# Patient Record
Sex: MALE | Race: OTHER | NOT HISPANIC OR LATINO | Employment: OTHER | URBAN - METROPOLITAN AREA
[De-identification: names, ages, dates, MRNs, and addresses within clinical notes are randomized per-mention and may not be internally consistent; named-entity substitution may affect disease eponyms.]

---

## 2020-01-30 ENCOUNTER — OFFICE VISIT (OUTPATIENT)
Dept: FAMILY MEDICINE | Facility: CLINIC | Age: 70
End: 2020-01-30
Payer: MEDICARE

## 2020-01-30 VITALS
WEIGHT: 200.94 LBS | HEIGHT: 72 IN | BODY MASS INDEX: 27.21 KG/M2 | OXYGEN SATURATION: 95 % | TEMPERATURE: 99 F | SYSTOLIC BLOOD PRESSURE: 122 MMHG | HEART RATE: 93 BPM | DIASTOLIC BLOOD PRESSURE: 62 MMHG

## 2020-01-30 DIAGNOSIS — Z00.00 ANNUAL PHYSICAL EXAM: Primary | ICD-10-CM

## 2020-01-30 DIAGNOSIS — R73.09 ABNORMAL GLUCOSE: ICD-10-CM

## 2020-01-30 DIAGNOSIS — Z23 ENCOUNTER FOR ADMINISTRATION OF VACCINE: ICD-10-CM

## 2020-01-30 DIAGNOSIS — J06.9 UPPER RESPIRATORY TRACT INFECTION, UNSPECIFIED TYPE: ICD-10-CM

## 2020-01-30 DIAGNOSIS — I48.19 PERSISTENT ATRIAL FIBRILLATION: ICD-10-CM

## 2020-01-30 DIAGNOSIS — Z72.0 TOBACCO USE: ICD-10-CM

## 2020-01-30 DIAGNOSIS — R05.9 COUGH: ICD-10-CM

## 2020-01-30 DIAGNOSIS — E66.3 OVERWEIGHT (BMI 25.0-29.9): ICD-10-CM

## 2020-01-30 DIAGNOSIS — E78.9 DISORDER OF LIPID METABOLISM: ICD-10-CM

## 2020-01-30 DIAGNOSIS — Z79.01 CHRONIC ANTICOAGULATION: ICD-10-CM

## 2020-01-30 DIAGNOSIS — Z12.11 COLON CANCER SCREENING: ICD-10-CM

## 2020-01-30 PROCEDURE — 1159F MED LIST DOCD IN RCRD: CPT | Mod: S$GLB,,, | Performed by: FAMILY MEDICINE

## 2020-01-30 PROCEDURE — G0008 FLU VACCINE - HIGH DOSE (65+) PRESERVATIVE FREE IM: ICD-10-PCS | Mod: S$GLB,,, | Performed by: FAMILY MEDICINE

## 2020-01-30 PROCEDURE — 90670 PCV13 VACCINE IM: CPT | Mod: S$GLB,,, | Performed by: FAMILY MEDICINE

## 2020-01-30 PROCEDURE — 1126F AMNT PAIN NOTED NONE PRSNT: CPT | Mod: S$GLB,,, | Performed by: FAMILY MEDICINE

## 2020-01-30 PROCEDURE — 1159F PR MEDICATION LIST DOCUMENTED IN MEDICAL RECORD: ICD-10-PCS | Mod: S$GLB,,, | Performed by: FAMILY MEDICINE

## 2020-01-30 PROCEDURE — 90662 FLU VACCINE - HIGH DOSE (65+) PRESERVATIVE FREE IM: ICD-10-PCS | Mod: S$GLB,,, | Performed by: FAMILY MEDICINE

## 2020-01-30 PROCEDURE — 90662 IIV NO PRSV INCREASED AG IM: CPT | Mod: S$GLB,,, | Performed by: FAMILY MEDICINE

## 2020-01-30 PROCEDURE — G0009 PNEUMOCOCCAL CONJUGATE VACCINE 13-VALENT LESS THAN 5YO & GREATER THAN: ICD-10-PCS | Mod: S$GLB,,, | Performed by: FAMILY MEDICINE

## 2020-01-30 PROCEDURE — 1126F PR PAIN SEVERITY QUANTIFIED, NO PAIN PRESENT: ICD-10-PCS | Mod: S$GLB,,, | Performed by: FAMILY MEDICINE

## 2020-01-30 PROCEDURE — 90670 PNEUMOCOCCAL CONJUGATE VACCINE 13-VALENT LESS THAN 5YO & GREATER THAN: ICD-10-PCS | Mod: S$GLB,,, | Performed by: FAMILY MEDICINE

## 2020-01-30 PROCEDURE — 99202 OFFICE O/P NEW SF 15 MIN: CPT | Mod: 25,S$GLB,, | Performed by: FAMILY MEDICINE

## 2020-01-30 PROCEDURE — G0009 ADMIN PNEUMOCOCCAL VACCINE: HCPCS | Mod: S$GLB,,, | Performed by: FAMILY MEDICINE

## 2020-01-30 PROCEDURE — 1101F PR PT FALLS ASSESS DOC 0-1 FALLS W/OUT INJ PAST YR: ICD-10-PCS | Mod: CPTII,S$GLB,, | Performed by: FAMILY MEDICINE

## 2020-01-30 PROCEDURE — 99999 PR PBB SHADOW E&M-NEW PATIENT-LVL III: CPT | Mod: PBBFAC,,, | Performed by: FAMILY MEDICINE

## 2020-01-30 PROCEDURE — 99202 PR OFFICE/OUTPT VISIT, NEW, LEVL II, 15-29 MIN: ICD-10-PCS | Mod: 25,S$GLB,, | Performed by: FAMILY MEDICINE

## 2020-01-30 PROCEDURE — 99999 PR PBB SHADOW E&M-NEW PATIENT-LVL III: ICD-10-PCS | Mod: PBBFAC,,, | Performed by: FAMILY MEDICINE

## 2020-01-30 PROCEDURE — 1101F PT FALLS ASSESS-DOCD LE1/YR: CPT | Mod: CPTII,S$GLB,, | Performed by: FAMILY MEDICINE

## 2020-01-30 PROCEDURE — G0008 ADMIN INFLUENZA VIRUS VAC: HCPCS | Mod: S$GLB,,, | Performed by: FAMILY MEDICINE

## 2020-01-30 RX ORDER — WARFARIN SODIUM 5 MG/1
TABLET ORAL
COMMUNITY
End: 2023-09-01

## 2020-01-30 RX ORDER — WARFARIN SODIUM 5 MG/1
TABLET ORAL
COMMUNITY
Start: 2019-12-20 | End: 2020-01-30 | Stop reason: SDUPTHER

## 2020-01-30 NOTE — PROGRESS NOTES
Office Visit    Patient Name: Ryan Kenney    : 1950  MRN: [id number]      Assessment/Plan:  Ryan Kenney is a 69 y.o. male who presents today for :    Annual physical exam  -     Hemoglobin A1c; Future; Expected date: 2020  -     CBC Without Differential; Future; Expected date: 2020  -     Comprehensive metabolic panel; Future; Expected date: 2020  -     Lipid panel; Future; Expected date: 2020  -     Hepatitis C antibody; Future; Expected date: 2020  Encounter for administration of vaccine  -     Influenza - High Dose (65+) (PF) (IM)  -     Cancel: (In Office Administered) Pneumococcal Polysaccharide Vaccine (23 Valent) (SQ/IM)  Colon cancer screening  -     Fecal Immunochemical Test (iFOBT); Future; Expected date: 2020  Overweight (BMI 25.0-29.9)  -anticipatory guidance provided with age appropriate preventative services discussed, healthy diet and regular physical exercise also discussed with patient  -any additional health maintenance will be readdressed at the next physical if declined or deferred by the patient today   -Recommend 15-30 minutes of moderate intensity exercise 5 days/week.              Follow up 6mo          Additional Evaluation & Management issues:     In addition to today's Annual Physical, patient has other medical issues that need to be addressed, as well as their associated prescription management that is separate from today's Physical  - as documented separately below the Annual Physical portion of this encounter.        This note was created by combination of typed  and MModal dictation.  Transcription errors may be present.  If there are any questions, please contact me.        ----------------------------------------------------------------------------------------------------------------------      HPI:  Patient Care Team:  Ilya Finney MD as PCP - General (Family Medicine)  Mike Wesley MD (Cardiology)    Ryan is a  69 y.o. male with      Patient Active Problem List   Diagnosis    Tobacco use    Overweight (BMI 25.0-29.9)    Persistent atrial fibrillation - on Coumadin - monitored regularly at Cardiology's office @     Chronic anticoagulation     This patient is new to me       Patient presents today for:  URI; Cough; and Establish Care      In addition to addressing the reasons for this office visit as above, which is further discussed and addressed in the separate E&M section of this note, patient also due for annual bloodwork today.  Health maintenance-wise, he is due for colon cancer screening.   Otherwise, no major new changes in health since last checkup with prior PCP several years ago. He sees Cardiology at  for management of pAFib, for which he is on coumadin as monitored by his Cardiology's office regularly.  He denies CP/SOB/DOYLE.  He denies any cardiovascular or neurologic complaints today        Additional ROS  No F/C/wt changes/fatigue  No dysphagia/sore throat, +rhinorrhea/cough/nasal congestion  No CP/DOYLE/palpitations/swelling  No wheezing/SOB  No nausea/vomiting/abd pain/no diarrhea, no constipation, no blood in stool  No muscle aches, no joint pain   No rashes  No MSK weakness/HA/tingling/numbness  No anxiety/depression  No dysuria/hematuria  No polyuria/polydipsia/fatigue/cold or hot intolerance          Current Medications  Medications reviewed and updated.       Current Outpatient Medications:     warfarin (COUMADIN) 5 MG tablet, Take by mouth., Disp: , Rfl:     History reviewed. No pertinent surgical history.    History reviewed. No pertinent family history.    Social History     Socioeconomic History    Marital status: Unknown     Spouse name: Not on file    Number of children: Not on file    Years of education: Not on file    Highest education level: Not on file   Occupational History    Not on file   Social Needs    Financial resource strain: Not on file    Food insecurity:     Worry: Not  on file     Inability: Not on file    Transportation needs:     Medical: Not on file     Non-medical: Not on file   Tobacco Use    Smoking status: Current Every Day Smoker   Substance and Sexual Activity    Alcohol use: Not on file    Drug use: Not on file    Sexual activity: Not on file   Lifestyle    Physical activity:     Days per week: Not on file     Minutes per session: Not on file    Stress: Not on file   Relationships    Social connections:     Talks on phone: Not on file     Gets together: Not on file     Attends Gnosticist service: Not on file     Active member of club or organization: Not on file     Attends meetings of clubs or organizations: Not on file     Relationship status: Not on file   Other Topics Concern    Not on file   Social History Narrative    Not on file           Allergies   Review of patient's allergies indicates:  No Known Allergies          Review of Systems  See HPI      Physical Exam  /62   Pulse 93   Temp 98.7 °F (37.1 °C)   Ht 6' (1.829 m)   Wt 91.2 kg (200 lb 15.2 oz)   SpO2 95%   BMI 27.25 kg/m²       GEN: NAD, well developed, pleasant, well nourished  HEENT: NCAT, PERRLA, EOMI, sclera clear, anicteric, O/P without erythema nor swollen tonsils, +PND, MMM with no lesions, TM clear bilaterally with normal light reflex, no bulging/effusion. No maxillary/frontal sinus TTP. No trismus/uvula deviation  NECK: normal, supple with midline trachea, no LAD, no thyromegaly  LUNGS: CTAB, no w/r/r, no increased work of breathing   HEART: +irregularly irregular rhythm with normal rate, normal S1 and S2, no m/r/g, no edema  ABD: s/nt/nd, NABS  SKIN: normal turgor, no rashes  PSYCH: AOx3, appropriate mood and affect  MSK: warm/well perfused, normal ROM in all extremities, no c/c/e.  NEURO: normal without focal findings, CN II-XII are grossly intact.  Sensation/strength grossly normal, gait and station normal.         Labs  No results found for: LABA1C, HGBA1C  No results  found for: NA, K, CL, CO2, BUN, CREATININE, CALCIUM, ANIONGAP, ESTGFRAFRICA, EGFRNONAA  No results found for: CHOL  No results found for: HDL  No results found for: LDLCALC  No results found for: TRIG  No results found for: CHOLHDL  Last set of blood work has been reviewed as noted above.          __________________________________________________________________________________________________________________________________      Additional Evaluation & Management issues:     In addition to today's Annual Physical, patient has other medical issues that need to be addressed, as well as their associated prescription management that is separate from today's Physical  - as documented separately below      HPI:    Patient presents today for:  URI; Cough; and Establish Care      Pt has had nasal congestion/cough the past week - feels like Sx has mostly resolved with taking OTC Delsym the past few days. Still has mild runny nose. He denies any facial pressure and pain. He has mild +occasional mild productive cough of clear phlegm. No sick contact with similar Sx.No sore throat.  No body aches.  No ear pain.  No F/C    pAfib - stable, Dx'd over 20 years ago - sees Cardiology Dr. Wesley at  for management regularly - no recent complications. Had routine labs and heart imaging studies done the past year, DEBI requested today for records.  He denies CP/SOB/DOYLE.  Patient has no gum/GI bleeding/new skin bruising with taking long term oral anticoagulation medication, which we discussed and reviewed bleeding risks associated with OAC today and the need to contact our office/go to ER right away should something like that occur.          Additional ROS  No F/C/wt changes/fatigue  No dysphagia/sore throat, +rhinorrhea/cough/nasal congestion  No CP/DOYLE/palpitations/swelling  No wheezing/SOB  No nausea/vomiting/abd pain/no diarrhea  No muscle aches, no joint pain   No rashes                Review of Systems  See  HPI        Physical Exam  /62   Pulse 93   Temp 98.7 °F (37.1 °C)   Ht 6' (1.829 m)   Wt 91.2 kg (200 lb 15.2 oz)   SpO2 95%   BMI 27.25 kg/m²       GEN: NAD, well developed, pleasant, well nourished  HEENT: NCAT, PERRLA, EOMI, sclera clear, anicteric, O/P without erythema nor swollen tonsils, +PND, MMM with no lesions, TM clear bilaterally with normal light reflex, no bulging/effusion. No maxillary/frontal sinus TTP. No trismus/uvula deviation  NECK: normal, supple with midline trachea, no LAD, no thyromegaly  LUNGS: CTAB, no w/r/r, no increased work of breathing   HEART: +irregularly irregular rhythm with normal rate, normal S1 and S2, no m/r/g, no edema  ABD: s/nt/nd, NABS  SKIN: normal turgor, no rashes  PSYCH: AOx3, appropriate mood and affect  MSK: warm/well perfused, normal ROM in all extremities, no c/c/e.                      Assessment/Plan:  Ryan Kenney is a 69 y.o. male who presents today for :    Upper respiratory tract infection, unspecified type  Cough  -Mild symptoms, most likely viral etiology  AFVSS in clinic today - appears to have mostly resolved.  -advised frequent hand washing, rest, and plenty of fluids.   -follow up as needed    Persistent atrial fibrillation - on Coumadin - monitored regularly at Cardiology's office @   -     [facility] Without Differential; Future; Expected date: 01/30/2020  -     Protime-INR; Future; Expected date: 01/30/2020  Chronic anticoagulation  -     Protime-INR; Future; Expected date: 01/30/2020  -stable, continue current medication regimen PRN  -f/u Cardiology as needed  DEBI requested today for records.                    Follow up for worsening Sx. Urgent care/ED precautions provided.

## 2020-01-30 NOTE — PROGRESS NOTES
Injections x 2 administered as ordered.  Tolerated well.  Told to wait in clinic for 15 mins.  Patient verbalized understanding.  [employer] was given to patient on 1/30/2020 2:27 PM

## 2020-07-15 ENCOUNTER — TELEPHONE (OUTPATIENT)
Dept: FAMILY MEDICINE | Facility: CLINIC | Age: 70
End: 2020-07-15

## 2020-07-15 DIAGNOSIS — D22.9 CHANGE IN COLOR OF SKIN MOLE: ICD-10-CM

## 2020-07-15 DIAGNOSIS — Z12.11 COLON CANCER SCREENING: Primary | ICD-10-CM

## 2020-07-15 NOTE — TELEPHONE ENCOUNTER
Patient states that it is on right shoulder blade. States that he can not see it but his wife seen it on his back and should see someone.    Please address the patient concerns. Patient states that he would like someone that accepts the PHN.     Thanks,  Steff

## 2020-07-15 NOTE — TELEPHONE ENCOUNTER
----- Message from Stacie Donavon sent at 7/15/2020  2:21 PM CDT -----  Regarding: referral  Type: Patient Call Back    Who called:pt    What is the request in detail:pt is requesting referral for dermatology for a mole. Call pt    Can the clinic reply by [facility]?    Would the patient rather a call back or a response via [facility]? call    Best call back number:[phone] (home)       Additional Information:

## 2020-07-16 NOTE — TELEPHONE ENCOUNTER
-please let patient know that I have made a referral for patient to see a Dermatologist for further evaluation and treatment. Please expect a call from the referral staff the next few days to set up an appointment with the specialist. If patient doesn't hear back from our referral staff by Monday please contact our Referral Coordinator Team at [phone] to check on the status of the referral.      Thanks

## 2020-07-20 ENCOUNTER — TELEPHONE (OUTPATIENT)
Dept: FAMILY MEDICINE | Facility: CLINIC | Age: 70
End: 2020-07-20

## 2020-07-20 NOTE — TELEPHONE ENCOUNTER
----- Message from Narcisa Michael sent at 7/20/2020 11:34 AM CDT -----  Contact: Self [phone]  Type:  Patient Requesting Referral    Who Called: Self    Referral to What Specialty: dermatology    Reason for Referral: mole on his back that he is concerned about    Does the patient want the referral with a specific physician?: no    [facility]    Would the patient rather a call back or a response via [facility]? Call back    Best Call Back Number: [phone]

## 2020-07-20 NOTE — TELEPHONE ENCOUNTER
Patient requesting a referral to Dermatology for a mole to his back that he say is getting bigger and he is worried about it. Please advise.

## 2020-07-20 NOTE — TELEPHONE ENCOUNTER
Spoke with patient about message below. Patient given Referrals phone number. Patient verbalized understanding.

## 2020-09-09 LAB — HEMOCCULT STL QL IA: POSITIVE

## 2020-09-14 ENCOUNTER — PATIENT OUTREACH (OUTPATIENT)
Dept: ADMINISTRATIVE | Facility: HOSPITAL | Age: 70
End: 2020-09-14

## 2020-09-14 ENCOUNTER — TELEPHONE (OUTPATIENT)
Dept: FAMILY MEDICINE | Facility: CLINIC | Age: 70
End: 2020-09-14

## 2020-09-14 DIAGNOSIS — R19.5 ABNORMAL STOOL TEST: Primary | ICD-10-CM

## 2020-09-14 RX ORDER — WARFARIN SODIUM 5 MG/1
TABLET ORAL
COMMUNITY
Start: 2020-07-16 | End: 2021-05-24

## 2020-09-14 NOTE — TELEPHONE ENCOUNTER
Please notify pf of abnormal Cologuard stool test, which I recommend him to set up a Colonoscopy for in order to check for any abnormalities in the colon. Order is placed. Endoscopy will contact him for further details.

## 2020-09-16 ENCOUNTER — PATIENT OUTREACH (OUTPATIENT)
Dept: ADMINISTRATIVE | Facility: HOSPITAL | Age: 70
End: 2020-09-16

## 2020-09-21 ENCOUNTER — TELEPHONE (OUTPATIENT)
Dept: FAMILY MEDICINE | Facility: CLINIC | Age: 70
End: 2020-09-21

## 2020-09-21 NOTE — TELEPHONE ENCOUNTER
----- Message from Scott Maher, Patient Care Assistant sent at 9/18/2020  4:01 PM CDT -----  Type:  Patient Returning Call    Who Called: GURU BELTRAN [[id number]]    Who Left Message for Patient: : Nidhi Kaminskipard    Does the patient know what this is regarding?:No    Best Call Back Number:[phone]    Additional Information:  None

## 2020-09-21 NOTE — TELEPHONE ENCOUNTER
Notified patient of results for colon guard test, pt verbalized understanding. Gave patient the department number [phone] to call to schedule colonoscopy.

## 2020-09-23 ENCOUNTER — TELEPHONE (OUTPATIENT)
Dept: FAMILY MEDICINE | Facility: CLINIC | Age: 70
End: 2020-09-23

## 2020-09-23 NOTE — TELEPHONE ENCOUNTER
----- Message from Ilya Finney MD sent at 9/23/2020  3:36 PM CDT -----  Regarding: RE: Anticoagulant clearance needed for proceedure  Please reach out to Cardiology Dr. Wesley's office as patient has not seen him for over a year to make sure he doesn't have any changes from Cardiology standpoint that may affect going forward with the procedure.  Alternative, we can have patient follow up with dr. Wesley as soon as he can for routine follow up and ask for clearance letter from the Cardiology to hold the Coumadin. Thanks.  ----- Message -----  From: Sil Stevenson LPN  Sent: 9/23/2020   3:34 PM CDT  To: Ilya Finney MD  Subject: FW: Anticoagulant clearance needed for proce#      ----- Message -----  From: Laney Whitehead LPN  Sent: 9/23/2020   3:18 PM CDT  To: [facility] Staff  Subject: Anticoagulant clearance needed for proceedure    Your patient has a case request for a/an (    colonoscopy           ).     Your permission is needed to hold (   coumadin         ) for (   5) days prior to the procedure.  Dr Wesley was sent a form to hold we have not heard from him.    Thank you,    [facility] Department    [phone] or [phone]

## 2020-10-07 DIAGNOSIS — Z12.11 COLON CANCER SCREENING: Primary | ICD-10-CM

## 2020-10-07 RX ORDER — SODIUM, POTASSIUM,MAG SULFATES 17.5-3.13G
1 SOLUTION, RECONSTITUTED, ORAL ORAL DAILY
Qty: 1 KIT | Refills: 0 | Status: SHIPPED | OUTPATIENT
Start: 2020-10-20 | End: 2020-10-22

## 2020-10-18 ENCOUNTER — CLINICAL SUPPORT (OUTPATIENT)
Dept: URGENT CARE | Facility: CLINIC | Age: 70
End: 2020-10-18
Payer: MEDICARE

## 2020-10-18 DIAGNOSIS — Z03.818 ENCOUNTER FOR OBSERVATION FOR SUSPECTED EXPOSURE TO OTHER BIOLOGICAL AGENTS RULED OUT: ICD-10-CM

## 2020-10-18 DIAGNOSIS — U07.1 COVID-19: Primary | ICD-10-CM

## 2020-10-18 PROCEDURE — 99211 PR OFFICE/OUTPT VISIT, EST, LEVL I: ICD-10-PCS | Mod: S$GLB,,, | Performed by: PHYSICIAN ASSISTANT

## 2020-10-18 PROCEDURE — 99211 OFF/OP EST MAY X REQ PHY/QHP: CPT | Mod: S$GLB,,, | Performed by: PHYSICIAN ASSISTANT

## 2020-10-18 PROCEDURE — U0003 INFECTIOUS AGENT DETECTION BY NUCLEIC ACID (DNA OR RNA); SEVERE ACUTE RESPIRATORY SYNDROME CORONAVIRUS 2 (SARS-COV-2) (CORONAVIRUS DISEASE [COVID-19]), AMPLIFIED PROBE TECHNIQUE, MAKING USE OF HIGH THROUGHPUT TECHNOLOGIES AS DESCRIBED BY CMS-2020-01-R: HCPCS

## 2020-10-19 LAB — SARS-COV-2 RNA RESP QL NAA+PROBE: NOT DETECTED

## 2020-10-21 ENCOUNTER — HOSPITAL ENCOUNTER (OUTPATIENT)
Facility: HOSPITAL | Age: 70
Discharge: HOME | End: 2020-10-21
Attending: INTERNAL MEDICINE | Admitting: INTERNAL MEDICINE
Payer: MEDICARE

## 2020-10-21 ENCOUNTER — ANESTHESIA EVENT (OUTPATIENT)
Dept: ENDOSCOPY | Facility: HOSPITAL | Age: 70
End: 2020-10-21
Payer: MEDICARE

## 2020-10-21 ENCOUNTER — ANESTHESIA (OUTPATIENT)
Dept: ENDOSCOPY | Facility: HOSPITAL | Age: 70
End: 2020-10-21
Payer: MEDICARE

## 2020-10-21 VITALS
DIASTOLIC BLOOD PRESSURE: 66 MMHG | SYSTOLIC BLOOD PRESSURE: 114 MMHG | TEMPERATURE: 98 F | RESPIRATION RATE: 18 BRPM | HEART RATE: 84 BPM | WEIGHT: 190 LBS | HEIGHT: 72 IN | OXYGEN SATURATION: 98 % | BODY MASS INDEX: 25.73 KG/M2

## 2020-10-21 DIAGNOSIS — R19.5 ABNORMAL STOOL TEST: ICD-10-CM

## 2020-10-21 LAB
INR PPP: 1.1 (ref 0.8–1.2)
PROTHROMBIN TIME: 11.8 SEC (ref 9–12.5)

## 2020-10-21 PROCEDURE — 37000008 HC ANESTHESIA 1ST 15 MINUTES: Performed by: INTERNAL MEDICINE

## 2020-10-21 PROCEDURE — D9220A PRA ANESTHESIA: Mod: PT,CRNA,, | Performed by: REGISTERED NURSE

## 2020-10-21 PROCEDURE — 88305 TISSUE EXAM BY PATHOLOGIST: CPT | Mod: 26,,, | Performed by: PATHOLOGY

## 2020-10-21 PROCEDURE — 27200997: Performed by: INTERNAL MEDICINE

## 2020-10-21 PROCEDURE — 45385 COLONOSCOPY W/LESION REMOVAL: CPT | Performed by: INTERNAL MEDICINE

## 2020-10-21 PROCEDURE — 25000003 PHARM REV CODE 250: Performed by: ANESTHESIOLOGY

## 2020-10-21 PROCEDURE — D9220A PRA ANESTHESIA: Mod: PT,ANES,, | Performed by: ANESTHESIOLOGY

## 2020-10-21 PROCEDURE — D9220A PRA ANESTHESIA: ICD-10-PCS | Mod: PT,ANES,, | Performed by: ANESTHESIOLOGY

## 2020-10-21 PROCEDURE — 88305 TISSUE EXAM BY PATHOLOGIST: CPT | Performed by: PATHOLOGY

## 2020-10-21 PROCEDURE — 25000003 PHARM REV CODE 250: Performed by: REGISTERED NURSE

## 2020-10-21 PROCEDURE — 45385 COLONOSCOPY W/LESION REMOVAL: CPT | Mod: PT,,, | Performed by: INTERNAL MEDICINE

## 2020-10-21 PROCEDURE — 63600175 PHARM REV CODE 636 W HCPCS: Performed by: REGISTERED NURSE

## 2020-10-21 PROCEDURE — 27201089 HC SNARE, DISP (ANY): Performed by: INTERNAL MEDICINE

## 2020-10-21 PROCEDURE — D9220A PRA ANESTHESIA: ICD-10-PCS | Mod: PT,CRNA,, | Performed by: REGISTERED NURSE

## 2020-10-21 PROCEDURE — 85610 PROTHROMBIN TIME: CPT

## 2020-10-21 PROCEDURE — 37000009 HC ANESTHESIA EA ADD 15 MINS: Performed by: INTERNAL MEDICINE

## 2020-10-21 PROCEDURE — 36415 COLL VENOUS BLD VENIPUNCTURE: CPT

## 2020-10-21 PROCEDURE — 88305 TISSUE EXAM BY PATHOLOGIST: ICD-10-PCS | Mod: 26,,, | Performed by: PATHOLOGY

## 2020-10-21 PROCEDURE — 45385 PR COLONOSCOPY,REMV LESN,SNARE: ICD-10-PCS | Mod: PT,,, | Performed by: INTERNAL MEDICINE

## 2020-10-21 RX ORDER — PROPOFOL 10 MG/ML
VIAL (ML) INTRAVENOUS
Status: DISCONTINUED | OUTPATIENT
Start: 2020-10-21 | End: 2020-10-21

## 2020-10-21 RX ORDER — LIDOCAINE HYDROCHLORIDE 10 MG/ML
1 INJECTION, SOLUTION EPIDURAL; INFILTRATION; INTRACAUDAL; PERINEURAL ONCE
Status: DISCONTINUED | OUTPATIENT
Start: 2020-10-21 | End: 2020-10-21 | Stop reason: HOSPADM

## 2020-10-21 RX ORDER — SODIUM CHLORIDE 9 MG/ML
INJECTION, SOLUTION INTRAVENOUS CONTINUOUS
Status: DISCONTINUED | OUTPATIENT
Start: 2020-10-21 | End: 2020-10-21 | Stop reason: HOSPADM

## 2020-10-21 RX ORDER — LIDOCAINE HYDROCHLORIDE 20 MG/ML
INJECTION, SOLUTION EPIDURAL; INFILTRATION; INTRACAUDAL; PERINEURAL
Status: DISCONTINUED
Start: 2020-10-21 | End: 2020-10-21 | Stop reason: HOSPADM

## 2020-10-21 RX ORDER — LIDOCAINE HYDROCHLORIDE 20 MG/ML
INJECTION INTRAVENOUS
Status: DISCONTINUED | OUTPATIENT
Start: 2020-10-21 | End: 2020-10-21

## 2020-10-21 RX ORDER — PROPOFOL 10 MG/ML
INJECTION, EMULSION INTRAVENOUS
Status: DISCONTINUED
Start: 2020-10-21 | End: 2020-10-21 | Stop reason: HOSPADM

## 2020-10-21 RX ADMIN — PROPOFOL 20 MG: 10 INJECTION, EMULSION INTRAVENOUS at 01:10

## 2020-10-21 RX ADMIN — PROPOFOL 50 MG: 10 INJECTION, EMULSION INTRAVENOUS at 01:10

## 2020-10-21 RX ADMIN — SODIUM CHLORIDE: 0.9 INJECTION, SOLUTION INTRAVENOUS at 12:10

## 2020-10-21 RX ADMIN — Medication 100 MG: at 01:10

## 2020-10-21 RX ADMIN — PROPOFOL 30 MG: 10 INJECTION, EMULSION INTRAVENOUS at 01:10

## 2020-10-21 NOTE — TRANSFER OF CARE
Anesthesia Transfer of Care Note    Patient: Ryan Kenney    Procedure(s) Performed: Procedure(s) (LRB):  COLONOSCOPY (N/A)    Patient location: GI    Anesthesia Type: general    Transport from OR: Transported from OR on room air with adequate spontaneous ventilation    Post pain: adequate analgesia    Post assessment: no apparent anesthetic complications and tolerated procedure well    Post vital signs: stable    Level of consciousness: awake and alert    Nausea/Vomiting: no nausea/vomiting    Complications: none    Transfer of care protocol was followed      Last vitals:   Visit Vitals  /66 (BP Location: Right arm, Patient Position: Lying)   Pulse 85   Temp 36.6 °C (97.9 °F) (Rectal)   Resp 14   Ht 6' (1.829 m)   Wt 86.2 kg (190 lb)   SpO2 95%   BMI 25.77 kg/m²

## 2020-10-21 NOTE — DISCHARGE INSTRUCTIONS

## 2020-10-21 NOTE — [employer] PATIENT INSTRUCTIONS
Discharge Summary/Instructions after an Endoscopic Procedure  Patient Name: Ryan Kenney  Patient MRN: [id number]  Patient YOB: 1950 Wednesday, October 21, 2020  Gary Giles MD  RESTRICTIONS:  During your procedure today, you received medications for sedation.  These   medications may affect your judgment, balance and coordination.  Therefore,   for 24 hours, you have the following restrictions:   - DO NOT drive a car, operate machinery, make legal/financial decisions,   sign important papers or drink alcohol.    ACTIVITY:  Today: no heavy lifting, straining or running due to procedural   sedation/anesthesia.  The following day: return to full activity including work.  DIET:  Eat and drink normally unless instructed otherwise.     TREATMENT FOR COMMON SIDE EFFECTS:  - Mild abdominal pain, nausea, belching, bloating or excessive gas:  rest,   eat lightly and use a heating pad.  - Sore Throat: treat with throat lozenges and/or gargle with warm salt   water.  - Because air was used during the procedure, expelling large amounts of air   from your rectum or belching is normal.  - If a bowel prep was taken, you may not have a bowel movement for 1-3 days.    This is normal.  SYMPTOMS TO WATCH FOR AND REPORT TO YOUR PHYSICIAN:  1. Abdominal pain or bloating, other than gas cramps.  2. Chest pain.  3. Back pain.  4. Signs of infection such as: chills or fever occurring within 24 hours   after the procedure.  5. Rectal bleeding, which would show as bright red, maroon, or black stools.   (A tablespoon of blood from the rectum is not serious, especially if   hemorrhoids are present.)  6. Vomiting.  7. Weakness or dizziness.  GO DIRECTLY TO THE NEAREST EMERGENCY ROOM IF YOU HAVE ANY OF THE FOLLOWING:      Difficulty breathing              Chills and/or fever over 101 F   Persistent vomiting and/or vomiting blood   Severe abdominal pain   Severe chest pain   Black, tarry stools   Bleeding- more than one  tablespoon   Any other symptom or condition that you feel may need urgent attention  Your doctor recommends these additional instructions:  If any biopsies were taken, your doctors clinic will contact you in 1 to 2   weeks with any results.  - Discharge patient to home.   - Resume Coumadin (warfarin) at prior dose today.   - Await pathology results.   - Telephone endoscopist for pathology results in 2 weeks.   - Repeat colonoscopy in 3 years for surveillance.   - Return to referring physician.   - The findings and recommendations were discussed with the patient.  For questions, problems or results please call your physician - Gary Giles MD at Work:  [phone].  [facility] can be reached at [phone]     IF A COMPLICATION OR EMERGENCY SITUATION ARISES AND YOU ARE UNABLE TO REACH   YOUR PHYSICIAN - GO DIRECTLY TO THE EMERGENCY ROOM.  Gary Giles MD  10/21/2020 1:51:53 PM  This report has been verified and signed electronically.  [employer]

## 2020-10-21 NOTE — H&P
[facility]  History & Physical    Subjective:      Chief Complaint/Reason for Admission:     colonoscopy    Ryan Kenney is a 70 y.o. male.    History reviewed. No pertinent past medical history.  History reviewed. No pertinent surgical history.  History reviewed. No pertinent family history.  Social History     Tobacco Use    Smoking status: Current Every Day Smoker   Substance Use Topics    Alcohol use: Not on file    Drug use: Not on file       PTA Medications   Medication Sig    sodium,potassium,mag sulfates (SUPREP BOWEL PREP KIT) 17.5-3.13-1.6 gram SolR Take 177 mLs by mouth once daily. for 2 days    warfarin (COUMADIN) 5 MG tablet Take by mouth.    warfarin (COUMADIN) 5 MG tablet TAKE 1 AND 1/2 TABLETS BY MOUTH ON MONDAY AND 1 TABLET ON ALL OTHER DAYS     Review of patient's allergies indicates:  No Known Allergies     Review of Systems   Constitutional: Negative for chills, fever and weight loss.   Respiratory: Negative for shortness of breath and wheezing.    Cardiovascular: Negative for chest pain.   Gastrointestinal: Negative for abdominal pain, blood in stool, constipation, diarrhea and melena.       Objective:      Vital Signs (Most Recent)  Temp: 97.9 °F (36.6 °C) (10/21/20 1226)  Pulse: 92 (10/21/20 1226)  Resp: 18 (10/21/20 1226)  BP: (!) 164/76 (10/21/20 1226)  SpO2: 95 % (10/21/20 1226)    Vital Signs Range (Last 24H):  Temp:  [97.9 °F (36.6 °C)]   Pulse:  [92]   Resp:  [18-20]   BP: (164)/(76)   SpO2:  [95 %]     Physical Exam  Constitutional:       Appearance: Normal appearance.   Cardiovascular:      Rate and Rhythm: Normal rate.   Pulmonary:      Effort: Pulmonary effort is normal.   Abdominal:      Palpations: Abdomen is soft.   Neurological:      Mental Status: He is alert and oriented to person, place, and time.           Assessment:      Active Hospital Problems    Diagnosis  POA    Abnormal stool test [R19.5]  Yes      Resolved Hospital Problems   No resolved  problems to display.       Plan:    Direct access colonoscopy for positive cologuard.  Average risk for colorectal cancer by history today and never had a colonoscopy before and no GI bleeding but does take coumadin for A-fib and not sure exactly when he last took his coumadin so INR was done today and INR=1.1

## 2020-10-21 NOTE — ANESTHESIA PREPROCEDURE EVALUATION
10/21/2020  Ryan Kenney is a 70 y.o., male.  To undergo Procedure(s) (LRB):  COLONOSCOPY (N/A)     Denies CP/SOB/GERD/MI/CVA/URI symptoms.  METS > 4  NPO > 8    Past Medical History:  No past medical history on file.    Past Surgical History:  No past surgical history on file.    Social History:  Social History     Socioeconomic History    Marital status: Unknown     Spouse name: Not on file    Number of children: Not on file    Years of education: Not on file    Highest education level: Not on file   Occupational History    Not on file   Social Needs    Financial resource strain: Not on file    Food insecurity     Worry: Not on file     Inability: Not on file    Transportation needs     Medical: Not on file     Non-medical: Not on file   Tobacco Use    Smoking status: Current Every Day Smoker   Substance and Sexual Activity    Alcohol use: Not on file    Drug use: Not on file    Sexual activity: Not on file   Lifestyle    Physical activity     Days per week: Not on file     Minutes per session: Not on file    Stress: Not on file   Relationships    Social connections     Talks on phone: Not on file     Gets together: Not on file     Attends Yazdanism service: Not on file     Active member of club or organization: Not on file     Attends meetings of clubs or organizations: Not on file     Relationship status: Not on file   Other Topics Concern    Not on file   Social History Narrative    Not on file       Medications:  No current facility-administered medications on file prior to encounter.      Current Outpatient Medications on File Prior to Encounter   Medication Sig Dispense Refill    warfarin (COUMADIN) 5 MG tablet Take by mouth.      warfarin (COUMADIN) 5 MG tablet TAKE 1 AND 1/2 TABLETS BY MOUTH ON MONDAY AND 1 TABLET ON ALL OTHER DAYS         Allergies:  Review of patient's  allergies indicates:  No Known Allergies    Active Problems:  Patient Active Problem List   Diagnosis    Tobacco use    Overweight (BMI 25.0-29.9)    Persistent atrial fibrillation - on Coumadin - monitored regularly at Cardiology's office @     Chronic anticoagulation       Diagnostic Studies:    CBC:  No results for input(s): WBC, RBC, HGB, HCT, PLT, MCV, MCH, MCHC in the last 24 hours.     CMP:  No results for input(s): GLUCOSE, CALCIUM, PROT, NA, K, CO2, CL, BUN, CREATININE, ALKPHOS, ALT, AST, BILITOT in the last 24 hours.    Invalid input(s):  ALBUMIN    PT/INR/PTT:  No results for input(s): PT, INR, APTT in the last 24 hours.    Results for GURU BELTRAN (MRN [id number]) as of 10/21/2020 08:10   Ref. Range 10/18/2020 09:50   SARS-CoV2 (COVID-19) Qualitative PCR Latest Ref Range: Not Detected  Not Detected     24 Hour Vitals:  BP: ()/()   Arterial Line BP: ()/()    See Nursing Charting For Additional Vitals    Anesthesia Evaluation    I have reviewed the Patient Summary Reports.    I have reviewed the Nursing Notes.       Review of Systems  Anesthesia Hx:  No problems with previous Anesthesia   Denies Personal Hx of Anesthesia complications.   Social:  Smoker    Cardiovascular:   Exercise tolerance: good Dysrhythmias atrial fibrillation Warfarin anticoagulation, held for 5 days   Pulmonary:  Pulmonary Normal    Hepatic/GI:  Hepatic/GI Normal    Neurological:  Neurology Normal    Endocrine:  Endocrine Normal        Physical Exam  General:  Well nourished    Airway/Jaw/Neck:   MP3, TMD > 3FB, teeth intact     Chest/Lungs:  Chest/Lungs Clear    Heart/Vascular:  Heart Findings: Normal            Anesthesia Plan  Type of Anesthesia, risks & benefits discussed:  Anesthesia Type:  general, MAC  Patient's Preference:   Intra-op Monitoring Plan: standard ASA monitors  Intra-op Monitoring Plan Comments:   Post Op Pain Control Plan: multimodal analgesia  Post Op Pain Control Plan Comments:   Induction:   IV  Beta  Blocker:  Patient is not currently on a Beta-Blocker (No further documentation required).       Informed Consent: Patient understands risks and agrees with Anesthesia plan.  Questions answered. Anesthesia consent signed with patient.  ASA Score: 3     Day of Surgery Review of History & Physical:  There are no significant changes.          Ready For Surgery From Anesthesia Perspective.

## 2020-10-22 NOTE — ANESTHESIA POSTPROCEDURE EVALUATION
Anesthesia Post Evaluation    Patient: Ryan Kenney    Procedure(s) Performed: Procedure(s) (LRB):  COLONOSCOPY (N/A)    Final Anesthesia Type: general    Patient location during evaluation: GI PACU  Patient participation: Yes- Able to Participate  Level of consciousness: awake and alert and oriented  Post-procedure vital signs: reviewed and stable  Pain management: adequate  Airway patency: patent    PONV status at discharge: No PONV  Anesthetic complications: no      Cardiovascular status: hemodynamically stable and blood pressure returned to baseline  Respiratory status: spontaneous ventilation, room air and unassisted  Hydration status: euvolemic  Follow-up not needed.          Vitals Value Taken Time   /66 10/21/20 1417   Temp 36.6 °C (97.9 °F) 10/21/20 1346   Pulse 84 10/21/20 1417   Resp 18 10/21/20 1417   SpO2 98 % 10/21/20 1417         Event Time   Out of Recovery 14:20:00         Pain/Ami Score: Ami Score: 9 (10/21/2020  1:46 PM)

## 2020-10-23 LAB
FINAL PATHOLOGIC DIAGNOSIS: NORMAL
GROSS: NORMAL
Lab: NORMAL

## 2020-10-27 ENCOUNTER — TELEPHONE (OUTPATIENT)
Dept: ENDOSCOPY | Facility: HOSPITAL | Age: 70
End: 2020-10-27

## 2020-10-27 NOTE — TELEPHONE ENCOUNTER
"----- Message from Gary Giles MD sent at 10/25/2020 10:26 AM CDT -----  Very Important:    Sierra  please tell Patient that their colonoscopy pathology was benign but at least one of their colon polyps was an ADVANCED COLON ADENOMA by size.    Because he was 70-years of age when they were diagnosed with an ADVANCED COLON ADENOMA its recommend that all their first degree relatives (Their Parent, Their Siblings and their children) all get their first screening colonoscopy at age 40 years of age base on the patient having and an ADVANCED COLON ADENOMA".    Also please tell the patient that HE should get their next surveillance colonoscopy in 3-years.    Part 1  Multiple fragments of colonic mucosa (submitted as transverse colon sessile polyp 5 mm):  -Fragments of tubular adenoma without significant atypia  Part 2  Fragments of colonic mucosa (4, submitted as sigmoid colon pedunculated polyp 10 mm):  -Tubular adenoma without significant atypia    "

## 2020-11-03 ENCOUNTER — NURSE TRIAGE (OUTPATIENT)
Dept: ADMINISTRATIVE | Facility: CLINIC | Age: 70
End: 2020-11-03

## 2020-11-03 NOTE — TELEPHONE ENCOUNTER
Pt contacted through the Post Procedural Symptom Tracker. No answer. No additional contact required per post procedure protocol.    Reason for Disposition   No answer.  First attempt to contact caller.  Follow-up call scheduled within 15 minutes.     Only one attempt per pp protocol.    Additional Information   Negative: Caller has already spoken with the PCP (or office), and has no further questions   Negative: Caller has already spoken with another triager and has no further questions   Negative: Caller has already spoken with another triager or PCP (or office), and has further questions and triager able to answer questions.    Protocols used: NO CONTACT OR DUPLICATE CONTACT CALL-A-OH

## 2021-01-22 ENCOUNTER — PATIENT MESSAGE (OUTPATIENT)
Dept: ADMINISTRATIVE | Facility: OTHER | Age: 71
End: 2021-01-22

## 2021-04-06 ENCOUNTER — PATIENT MESSAGE (OUTPATIENT)
Dept: ADMINISTRATIVE | Facility: HOSPITAL | Age: 71
End: 2021-04-06

## 2021-05-14 ENCOUNTER — TELEPHONE (OUTPATIENT)
Dept: FAMILY MEDICINE | Facility: CLINIC | Age: 71
End: 2021-05-14

## 2021-05-14 DIAGNOSIS — R79.9 ABNORMAL FINDING OF BLOOD CHEMISTRY, UNSPECIFIED: ICD-10-CM

## 2021-05-14 DIAGNOSIS — I48.19 PERSISTENT ATRIAL FIBRILLATION: ICD-10-CM

## 2021-05-14 DIAGNOSIS — Z00.00 ANNUAL PHYSICAL EXAM: Primary | ICD-10-CM

## 2021-05-14 DIAGNOSIS — R93.3 ABNORMAL FINDINGS ON DIAGNOSTIC IMAGING OF OTHER PARTS OF DIGESTIVE TRACT: ICD-10-CM

## 2021-05-19 ENCOUNTER — LAB VISIT (OUTPATIENT)
Dept: LAB | Facility: HOSPITAL | Age: 71
End: 2021-05-19
Attending: FAMILY MEDICINE
Payer: MEDICARE

## 2021-05-19 ENCOUNTER — TELEPHONE (OUTPATIENT)
Dept: FAMILY MEDICINE | Facility: CLINIC | Age: 71
End: 2021-05-19

## 2021-05-19 ENCOUNTER — TELEPHONE (OUTPATIENT)
Dept: CARDIOLOGY | Facility: CLINIC | Age: 71
End: 2021-05-19

## 2021-05-19 DIAGNOSIS — I48.19 PERSISTENT ATRIAL FIBRILLATION: ICD-10-CM

## 2021-05-19 DIAGNOSIS — R79.9 ABNORMAL FINDING OF BLOOD CHEMISTRY, UNSPECIFIED: ICD-10-CM

## 2021-05-19 DIAGNOSIS — Z00.00 ANNUAL PHYSICAL EXAM: ICD-10-CM

## 2021-05-19 DIAGNOSIS — R93.3 ABNORMAL FINDINGS ON DIAGNOSTIC IMAGING OF OTHER PARTS OF DIGESTIVE TRACT: ICD-10-CM

## 2021-05-19 LAB
ALBUMIN SERPL BCP-MCNC: 3.3 G/DL (ref 3.5–5.2)
ALP SERPL-CCNC: 77 U/L (ref 55–135)
ALT SERPL W/O P-5'-P-CCNC: 13 U/L (ref 10–44)
ANION GAP SERPL CALC-SCNC: 9 MMOL/L (ref 8–16)
AST SERPL-CCNC: 16 U/L (ref 10–40)
BILIRUB SERPL-MCNC: 1.1 MG/DL (ref 0.1–1)
BUN SERPL-MCNC: 12 MG/DL (ref 8–23)
CALCIUM SERPL-MCNC: 9.1 MG/DL (ref 8.7–10.5)
CHLORIDE SERPL-SCNC: 108 MMOL/L (ref 95–110)
CHOLEST SERPL-MCNC: 162 MG/DL (ref 120–199)
CHOLEST/HDLC SERPL: 3.9 {RATIO} (ref 2–5)
CO2 SERPL-SCNC: 24 MMOL/L (ref 23–29)
CREAT SERPL-MCNC: 0.8 MG/DL (ref 0.5–1.4)
ERYTHROCYTE [DISTWIDTH] IN BLOOD BY AUTOMATED COUNT: 13.1 % (ref 11.5–14.5)
EST. GFR  (AFRICAN AMERICAN): >60 ML/MIN/1.73 M^2
EST. GFR  (NON AFRICAN AMERICAN): >60 ML/MIN/1.73 M^2
ESTIMATED AVG GLUCOSE: 123 MG/DL (ref 68–131)
GLUCOSE SERPL-MCNC: 108 MG/DL (ref 70–110)
HBA1C MFR BLD: 5.9 % (ref 4–5.6)
HCT VFR BLD AUTO: 45 % (ref 40–54)
HCV AB SERPL QL IA: NEGATIVE
HDLC SERPL-MCNC: 42 MG/DL (ref 40–75)
HDLC SERPL: 25.9 % (ref 20–50)
HGB BLD-MCNC: 15.2 G/DL (ref 14–18)
LDLC SERPL CALC-MCNC: 103.8 MG/DL (ref 63–159)
MCH RBC QN AUTO: 32.3 PG (ref 27–31)
MCHC RBC AUTO-ENTMCNC: 33.8 G/DL (ref 32–36)
MCV RBC AUTO: 96 FL (ref 82–98)
NONHDLC SERPL-MCNC: 120 MG/DL
PLATELET # BLD AUTO: 176 K/UL (ref 150–450)
PMV BLD AUTO: 9.9 FL (ref 9.2–12.9)
POTASSIUM SERPL-SCNC: 4.1 MMOL/L (ref 3.5–5.1)
PROT SERPL-MCNC: 6.8 G/DL (ref 6–8.4)
RBC # BLD AUTO: 4.71 M/UL (ref 4.6–6.2)
SODIUM SERPL-SCNC: 141 MMOL/L (ref 136–145)
TRIGL SERPL-MCNC: 81 MG/DL (ref 30–150)
WBC # BLD AUTO: 6.46 K/UL (ref 3.9–12.7)

## 2021-05-19 PROCEDURE — 80053 COMPREHEN METABOLIC PANEL: CPT | Performed by: FAMILY MEDICINE

## 2021-05-19 PROCEDURE — 85027 COMPLETE CBC AUTOMATED: CPT | Performed by: FAMILY MEDICINE

## 2021-05-19 PROCEDURE — 36415 COLL VENOUS BLD VENIPUNCTURE: CPT | Performed by: FAMILY MEDICINE

## 2021-05-19 PROCEDURE — 83036 HEMOGLOBIN GLYCOSYLATED A1C: CPT | Performed by: FAMILY MEDICINE

## 2021-05-19 PROCEDURE — 86803 HEPATITIS C AB TEST: CPT | Performed by: FAMILY MEDICINE

## 2021-05-19 PROCEDURE — 80061 LIPID PANEL: CPT | Performed by: FAMILY MEDICINE

## 2021-05-24 ENCOUNTER — OFFICE VISIT (OUTPATIENT)
Dept: FAMILY MEDICINE | Facility: CLINIC | Age: 71
End: 2021-05-24
Payer: MEDICARE

## 2021-05-24 VITALS
BODY MASS INDEX: 24.93 KG/M2 | DIASTOLIC BLOOD PRESSURE: 80 MMHG | HEIGHT: 72 IN | RESPIRATION RATE: 18 BRPM | SYSTOLIC BLOOD PRESSURE: 130 MMHG | OXYGEN SATURATION: 96 % | WEIGHT: 184.06 LBS | HEART RATE: 96 BPM

## 2021-05-24 DIAGNOSIS — Z79.01 CHRONIC ANTICOAGULATION: ICD-10-CM

## 2021-05-24 DIAGNOSIS — Z00.00 ANNUAL PHYSICAL EXAM: Primary | ICD-10-CM

## 2021-05-24 DIAGNOSIS — Z23 PNEUMOCOCCAL VACCINE ADMINISTERED: ICD-10-CM

## 2021-05-24 DIAGNOSIS — J30.89 NON-SEASONAL ALLERGIC RHINITIS DUE TO OTHER ALLERGIC TRIGGER: ICD-10-CM

## 2021-05-24 DIAGNOSIS — I48.19 PERSISTENT ATRIAL FIBRILLATION: ICD-10-CM

## 2021-05-24 DIAGNOSIS — R73.03 PREDIABETES: ICD-10-CM

## 2021-05-24 DIAGNOSIS — Z72.0 TOBACCO USE: ICD-10-CM

## 2021-05-24 DIAGNOSIS — Z23 ENCOUNTER FOR ADMINISTRATION OF VACCINE: ICD-10-CM

## 2021-05-24 PROCEDURE — 99214 PR OFFICE/OUTPT VISIT, EST, LEVL IV, 30-39 MIN: ICD-10-PCS | Mod: 25,S$GLB,, | Performed by: FAMILY MEDICINE

## 2021-05-24 PROCEDURE — 1159F MED LIST DOCD IN RCRD: CPT | Mod: S$GLB,,, | Performed by: FAMILY MEDICINE

## 2021-05-24 PROCEDURE — G0009 PNEUMOCOCCAL POLYSACCHARIDE VACCINE 23-VALENT =>2YO SQ IM: ICD-10-PCS | Mod: S$GLB,,, | Performed by: FAMILY MEDICINE

## 2021-05-24 PROCEDURE — 99214 OFFICE O/P EST MOD 30 MIN: CPT | Mod: 25,S$GLB,, | Performed by: FAMILY MEDICINE

## 2021-05-24 PROCEDURE — 99999 PR PBB SHADOW E&M-EST. PATIENT-LVL III: ICD-10-PCS | Mod: PBBFAC,,, | Performed by: FAMILY MEDICINE

## 2021-05-24 PROCEDURE — 90732 PPSV23 VACC 2 YRS+ SUBQ/IM: CPT | Mod: S$GLB,,, | Performed by: FAMILY MEDICINE

## 2021-05-24 PROCEDURE — G0009 ADMIN PNEUMOCOCCAL VACCINE: HCPCS | Mod: S$GLB,,, | Performed by: FAMILY MEDICINE

## 2021-05-24 PROCEDURE — 3008F BODY MASS INDEX DOCD: CPT | Mod: CPTII,S$GLB,, | Performed by: FAMILY MEDICINE

## 2021-05-24 PROCEDURE — 99499 UNLISTED E&M SERVICE: CPT | Mod: S$GLB,,, | Performed by: FAMILY MEDICINE

## 2021-05-24 PROCEDURE — 90732 PNEUMOCOCCAL POLYSACCHARIDE VACCINE 23-VALENT =>2YO SQ IM: ICD-10-PCS | Mod: S$GLB,,, | Performed by: FAMILY MEDICINE

## 2021-05-24 PROCEDURE — 1126F AMNT PAIN NOTED NONE PRSNT: CPT | Mod: S$GLB,,, | Performed by: FAMILY MEDICINE

## 2021-05-24 PROCEDURE — 3288F FALL RISK ASSESSMENT DOCD: CPT | Mod: CPTII,S$GLB,, | Performed by: FAMILY MEDICINE

## 2021-05-24 PROCEDURE — 1101F PT FALLS ASSESS-DOCD LE1/YR: CPT | Mod: CPTII,S$GLB,, | Performed by: FAMILY MEDICINE

## 2021-05-24 PROCEDURE — 99999 PR PBB SHADOW E&M-EST. PATIENT-LVL III: CPT | Mod: PBBFAC,,, | Performed by: FAMILY MEDICINE

## 2021-05-24 PROCEDURE — 1101F PR PT FALLS ASSESS DOC 0-1 FALLS W/OUT INJ PAST YR: ICD-10-PCS | Mod: CPTII,S$GLB,, | Performed by: FAMILY MEDICINE

## 2021-05-24 PROCEDURE — 1159F PR MEDICATION LIST DOCUMENTED IN MEDICAL RECORD: ICD-10-PCS | Mod: S$GLB,,, | Performed by: FAMILY MEDICINE

## 2021-05-24 PROCEDURE — 1126F PR PAIN SEVERITY QUANTIFIED, NO PAIN PRESENT: ICD-10-PCS | Mod: S$GLB,,, | Performed by: FAMILY MEDICINE

## 2021-05-24 PROCEDURE — 3288F PR FALLS RISK ASSESSMENT DOCUMENTED: ICD-10-PCS | Mod: CPTII,S$GLB,, | Performed by: FAMILY MEDICINE

## 2021-05-24 PROCEDURE — 99499 RISK ADDL DX/OHS AUDIT: ICD-10-PCS | Mod: S$GLB,,, | Performed by: FAMILY MEDICINE

## 2021-05-24 PROCEDURE — 3008F PR BODY MASS INDEX (BMI) DOCUMENTED: ICD-10-PCS | Mod: CPTII,S$GLB,, | Performed by: FAMILY MEDICINE

## 2021-05-24 RX ORDER — LORATADINE 10 MG/1
10 TABLET ORAL DAILY PRN
Qty: 90 TABLET | Refills: 3 | Status: SHIPPED | OUTPATIENT
Start: 2021-05-24

## 2021-05-24 RX ORDER — LORATADINE 10 MG/1
10 TABLET ORAL DAILY
COMMUNITY
Start: 2021-05-10 | End: 2021-05-24 | Stop reason: SDUPTHER

## 2021-05-24 RX ORDER — METOPROLOL SUCCINATE 25 MG/1
12.5 TABLET, EXTENDED RELEASE ORAL DAILY
COMMUNITY
Start: 2021-05-05 | End: 2022-06-28

## 2021-05-24 RX ORDER — SACUBITRIL AND VALSARTAN 24; 26 MG/1; MG/1
1 TABLET, FILM COATED ORAL 2 TIMES DAILY
COMMUNITY
Start: 2021-04-25

## 2021-07-15 NOTE — ADDENDUM NOTE
[phone] (TAD) [facility] for  Juliana Slater to call back to schedule surgery. Asked she leave a good time to reach her.   Addended by: BECKA CAPONE on: 9/16/2020 08:31 AM     Modules accepted: Orders

## 2022-05-31 ENCOUNTER — PATIENT MESSAGE (OUTPATIENT)
Dept: ADMINISTRATIVE | Facility: HOSPITAL | Age: 72
End: 2022-05-31
Payer: MEDICARE

## 2022-06-28 ENCOUNTER — OFFICE VISIT (OUTPATIENT)
Dept: FAMILY MEDICINE | Facility: CLINIC | Age: 72
End: 2022-06-28
Payer: MEDICARE

## 2022-06-28 VITALS
SYSTOLIC BLOOD PRESSURE: 122 MMHG | WEIGHT: 178.38 LBS | OXYGEN SATURATION: 94 % | BODY MASS INDEX: 24.16 KG/M2 | HEIGHT: 72 IN | DIASTOLIC BLOOD PRESSURE: 68 MMHG | HEART RATE: 57 BPM | TEMPERATURE: 98 F

## 2022-06-28 DIAGNOSIS — Z23 NEED FOR SHINGLES VACCINE: ICD-10-CM

## 2022-06-28 DIAGNOSIS — Z00.00 PREVENTATIVE HEALTH CARE: Primary | ICD-10-CM

## 2022-06-28 DIAGNOSIS — Z79.01 CHRONIC ANTICOAGULATION: ICD-10-CM

## 2022-06-28 DIAGNOSIS — I48.19 PERSISTENT ATRIAL FIBRILLATION: ICD-10-CM

## 2022-06-28 PROCEDURE — 3074F SYST BP LT 130 MM HG: CPT | Mod: CPTII,S$GLB,, | Performed by: INTERNAL MEDICINE

## 2022-06-28 PROCEDURE — 1159F MED LIST DOCD IN RCRD: CPT | Mod: CPTII,S$GLB,, | Performed by: INTERNAL MEDICINE

## 2022-06-28 PROCEDURE — 3288F PR FALLS RISK ASSESSMENT DOCUMENTED: ICD-10-PCS | Mod: CPTII,S$GLB,, | Performed by: INTERNAL MEDICINE

## 2022-06-28 PROCEDURE — 4010F ACE/ARB THERAPY RXD/TAKEN: CPT | Mod: CPTII,S$GLB,, | Performed by: INTERNAL MEDICINE

## 2022-06-28 PROCEDURE — 99499 UNLISTED E&M SERVICE: CPT | Mod: S$GLB,,, | Performed by: INTERNAL MEDICINE

## 2022-06-28 PROCEDURE — 1126F PR PAIN SEVERITY QUANTIFIED, NO PAIN PRESENT: ICD-10-PCS | Mod: CPTII,S$GLB,, | Performed by: INTERNAL MEDICINE

## 2022-06-28 PROCEDURE — 3074F PR MOST RECENT SYSTOLIC BLOOD PRESSURE < 130 MM HG: ICD-10-PCS | Mod: CPTII,S$GLB,, | Performed by: INTERNAL MEDICINE

## 2022-06-28 PROCEDURE — 1101F PR PT FALLS ASSESS DOC 0-1 FALLS W/OUT INJ PAST YR: ICD-10-PCS | Mod: CPTII,S$GLB,, | Performed by: INTERNAL MEDICINE

## 2022-06-28 PROCEDURE — 1160F RVW MEDS BY RX/DR IN RCRD: CPT | Mod: CPTII,S$GLB,, | Performed by: INTERNAL MEDICINE

## 2022-06-28 PROCEDURE — 1126F AMNT PAIN NOTED NONE PRSNT: CPT | Mod: CPTII,S$GLB,, | Performed by: INTERNAL MEDICINE

## 2022-06-28 PROCEDURE — 1160F PR REVIEW ALL MEDS BY PRESCRIBER/CLIN PHARMACIST DOCUMENTED: ICD-10-PCS | Mod: CPTII,S$GLB,, | Performed by: INTERNAL MEDICINE

## 2022-06-28 PROCEDURE — 1101F PT FALLS ASSESS-DOCD LE1/YR: CPT | Mod: CPTII,S$GLB,, | Performed by: INTERNAL MEDICINE

## 2022-06-28 PROCEDURE — 99397 PR PREVENTIVE VISIT,EST,65 & OVER: ICD-10-PCS | Mod: GZ,S$GLB,, | Performed by: INTERNAL MEDICINE

## 2022-06-28 PROCEDURE — 3288F FALL RISK ASSESSMENT DOCD: CPT | Mod: CPTII,S$GLB,, | Performed by: INTERNAL MEDICINE

## 2022-06-28 PROCEDURE — 3008F PR BODY MASS INDEX (BMI) DOCUMENTED: ICD-10-PCS | Mod: CPTII,S$GLB,, | Performed by: INTERNAL MEDICINE

## 2022-06-28 PROCEDURE — 99397 PER PM REEVAL EST PAT 65+ YR: CPT | Mod: GZ,S$GLB,, | Performed by: INTERNAL MEDICINE

## 2022-06-28 PROCEDURE — 3078F PR MOST RECENT DIASTOLIC BLOOD PRESSURE < 80 MM HG: ICD-10-PCS | Mod: CPTII,S$GLB,, | Performed by: INTERNAL MEDICINE

## 2022-06-28 PROCEDURE — 1159F PR MEDICATION LIST DOCUMENTED IN MEDICAL RECORD: ICD-10-PCS | Mod: CPTII,S$GLB,, | Performed by: INTERNAL MEDICINE

## 2022-06-28 PROCEDURE — 99999 PR PBB SHADOW E&M-EST. PATIENT-LVL III: CPT | Mod: PBBFAC,,, | Performed by: INTERNAL MEDICINE

## 2022-06-28 PROCEDURE — 99499 RISK ADDL DX/OHS AUDIT: ICD-10-PCS | Mod: S$GLB,,, | Performed by: INTERNAL MEDICINE

## 2022-06-28 PROCEDURE — 4010F PR ACE/ARB THEARPY RXD/TAKEN: ICD-10-PCS | Mod: CPTII,S$GLB,, | Performed by: INTERNAL MEDICINE

## 2022-06-28 PROCEDURE — 3078F DIAST BP <80 MM HG: CPT | Mod: CPTII,S$GLB,, | Performed by: INTERNAL MEDICINE

## 2022-06-28 PROCEDURE — 3008F BODY MASS INDEX DOCD: CPT | Mod: CPTII,S$GLB,, | Performed by: INTERNAL MEDICINE

## 2022-06-28 PROCEDURE — 99999 PR PBB SHADOW E&M-EST. PATIENT-LVL III: ICD-10-PCS | Mod: PBBFAC,,, | Performed by: INTERNAL MEDICINE

## 2022-06-28 RX ORDER — ZOSTER VACCINE RECOMBINANT, ADJUVANTED 50 MCG/0.5
0.5 KIT INTRAMUSCULAR ONCE
Qty: 1 EACH | Refills: 0 | Status: SHIPPED | OUTPATIENT
Start: 2022-06-28 | End: 2022-06-28

## 2022-06-28 RX ORDER — FUROSEMIDE 40 MG/1
40 TABLET ORAL DAILY
COMMUNITY
Start: 2022-06-03

## 2022-06-28 NOTE — PROGRESS NOTES
"Subjective:       Patient ID: Ryan Kenney is a 72 y.o. male.    Chief Complaint: Establish Care (New patient)    Est pcp    HPI: 71 y/o w/ afib HFpEF (on coumadin folowed by cards at [facility]-->Dr. Wesley). No orthostatic symptoms. On eyear ago was having swelling and increased dyspnea was started on daily furosemide now takes every other day feels breathign is at baseline, able to walk two blocks/one flight of stairs w/o exertional dyspnea. Admits limited physical activity since his intermediate. Wife was concerned because "he never gets bloodwork" reviewed labs done by cardiologist in March 2022    Review of Systems   Constitutional: Negative for activity change, appetite change, fatigue, fever and unexpected weight change.   HENT: Negative for ear pain, rhinorrhea and sore throat.    Eyes: Negative for discharge and visual disturbance.   Respiratory: Negative for chest tightness, shortness of breath and wheezing.    Cardiovascular: Negative for chest pain, palpitations and leg swelling.   Gastrointestinal: Negative for abdominal pain, constipation and diarrhea.   Endocrine: Negative for cold intolerance and heat intolerance.   Genitourinary: Negative for dysuria and hematuria.   Musculoskeletal: Negative for joint swelling and neck stiffness.   Skin: Negative for rash.   Neurological: Negative for dizziness, syncope, weakness and headaches.   Psychiatric/Behavioral: Negative for suicidal ideas.       Objective:     Vitals:    06/28/22 0958   BP: 122/68   BP Location: Right arm   Patient Position: Sitting   BP Method: Medium (Manual)   Pulse: (!) 57   Temp: 97.8 °F (36.6 °C)   TempSrc: Oral   SpO2: (!) 94%   Weight: 80.9 kg (178 lb 5.6 oz)   Height: 6' (1.829 m)          Physical Exam  Constitutional:       Appearance: He is well-developed.   HENT:      Head: Normocephalic and atraumatic.   Eyes:      Conjunctiva/sclera: Conjunctivae normal.   Cardiovascular:      Rate and Rhythm: Bradycardia present. Rhythm " irregular.      Heart sounds: No murmur heard.    No friction rub. No gallop.      Comments: irregularlly irregular rate in 50's  Pulmonary:      Effort: Pulmonary effort is normal.      Breath sounds: Normal breath sounds. No wheezing or rales.   Abdominal:      Palpations: Abdomen is soft.   Musculoskeletal:         General: No tenderness. Normal range of motion.      Cervical back: Normal range of motion.      Right lower leg: No edema.      Left lower leg: No edema.   Skin:     General: Skin is warm and dry.   Neurological:      Mental Status: He is alert and oriented to person, place, and time.      Cranial Nerves: No cranial nerve deficit.         Assessment and Plan   1. Preventative health care    Wear seatbelts at all times    Don't drink and drive    Wear bike helmet and other personal protective equipment when appropriate          2. Chronic anticoagulation  On coumadin monitored by outside coumadin clinic    3. Persistent atrial fibrillation - on Coumadin - monitored regularly at Cardiology's office @   Rate controlled folowed by card at [facility]     4. Need for shingles vaccine  shingrix #1 at local pharmacy  - varicella-zoster gE-AS01B, PF, (SHINGRIX, PF,) 50 mcg/0.5 mL injection; Inject 0.5 mLs into the muscle once. for 1 dose  Dispense: 1 each; Refill: 0

## 2022-08-04 ENCOUNTER — PES CALL (OUTPATIENT)
Dept: ADMINISTRATIVE | Facility: CLINIC | Age: 72
End: 2022-08-04
Payer: MEDICARE

## 2022-09-15 ENCOUNTER — PES CALL (OUTPATIENT)
Dept: ADMINISTRATIVE | Facility: CLINIC | Age: 72
End: 2022-09-15
Payer: MEDICARE

## 2023-04-03 ENCOUNTER — PES CALL (OUTPATIENT)
Dept: ADMINISTRATIVE | Facility: CLINIC | Age: 73
End: 2023-04-03
Payer: MEDICARE

## 2023-08-19 ENCOUNTER — HOSPITAL ENCOUNTER (EMERGENCY)
Facility: HOSPITAL | Age: 73
Discharge: HOME | End: 2023-08-19
Attending: EMERGENCY MEDICINE
Payer: MEDICARE

## 2023-08-19 VITALS
SYSTOLIC BLOOD PRESSURE: 130 MMHG | BODY MASS INDEX: 23.7 KG/M2 | TEMPERATURE: 98 F | DIASTOLIC BLOOD PRESSURE: 63 MMHG | HEART RATE: 85 BPM | RESPIRATION RATE: 18 BRPM | OXYGEN SATURATION: 96 % | WEIGHT: 175 LBS | HEIGHT: 72 IN

## 2023-08-19 DIAGNOSIS — E80.6 HYPERBILIRUBINEMIA: ICD-10-CM

## 2023-08-19 DIAGNOSIS — L03.114 CELLULITIS OF LEFT HAND: Primary | ICD-10-CM

## 2023-08-19 LAB
ALBUMIN SERPL BCP-MCNC: 3.9 G/DL (ref 3.5–5.2)
ALP SERPL-CCNC: 83 U/L (ref 55–135)
ALT SERPL W/O P-5'-P-CCNC: 14 U/L (ref 10–44)
ANION GAP SERPL CALC-SCNC: 14 MMOL/L (ref 8–16)
AST SERPL-CCNC: 18 U/L (ref 10–40)
BASOPHILS # BLD AUTO: 0.04 K/UL (ref 0–0.2)
BASOPHILS NFR BLD: 0.4 % (ref 0–1.9)
BILIRUB SERPL-MCNC: 2.9 MG/DL (ref 0.1–1)
BUN SERPL-MCNC: 16 MG/DL (ref 8–23)
CALCIUM SERPL-MCNC: 9.2 MG/DL (ref 8.7–10.5)
CHLORIDE SERPL-SCNC: 102 MMOL/L (ref 95–110)
CO2 SERPL-SCNC: 23 MMOL/L (ref 23–29)
CREAT SERPL-MCNC: 0.9 MG/DL (ref 0.5–1.4)
CRP SERPL-MCNC: 35.3 MG/L (ref 0–8.2)
DIFFERENTIAL METHOD: ABNORMAL
EOSINOPHIL # BLD AUTO: 0 K/UL (ref 0–0.5)
EOSINOPHIL NFR BLD: 0.1 % (ref 0–8)
ERYTHROCYTE [DISTWIDTH] IN BLOOD BY AUTOMATED COUNT: 13.8 % (ref 11.5–14.5)
EST. GFR  (NO RACE VARIABLE): >60 ML/MIN/1.73 M^2
GLUCOSE SERPL-MCNC: 105 MG/DL (ref 70–110)
HCT VFR BLD AUTO: 44.7 % (ref 40–54)
HGB BLD-MCNC: 14.7 G/DL (ref 14–18)
IMM GRANULOCYTES # BLD AUTO: 0.03 K/UL (ref 0–0.04)
IMM GRANULOCYTES NFR BLD AUTO: 0.3 % (ref 0–0.5)
LACTATE SERPL-SCNC: 1.8 MMOL/L (ref 0.5–2.2)
LYMPHOCYTES # BLD AUTO: 1.1 K/UL (ref 1–4.8)
LYMPHOCYTES NFR BLD: 10.3 % (ref 18–48)
MCH RBC QN AUTO: 32.2 PG (ref 27–31)
MCHC RBC AUTO-ENTMCNC: 32.9 G/DL (ref 32–36)
MCV RBC AUTO: 98 FL (ref 82–98)
MONOCYTES # BLD AUTO: 1.7 K/UL (ref 0.3–1)
MONOCYTES NFR BLD: 15.3 % (ref 4–15)
NEUTROPHILS # BLD AUTO: 8.1 K/UL (ref 1.8–7.7)
NEUTROPHILS NFR BLD: 73.6 % (ref 38–73)
NRBC BLD-RTO: 0 /100 WBC
PLATELET # BLD AUTO: 148 K/UL (ref 150–450)
PMV BLD AUTO: 10.3 FL (ref 9.2–12.9)
POTASSIUM SERPL-SCNC: 4.2 MMOL/L (ref 3.5–5.1)
PROT SERPL-MCNC: 7.5 G/DL (ref 6–8.4)
RBC # BLD AUTO: 4.56 M/UL (ref 4.6–6.2)
SODIUM SERPL-SCNC: 139 MMOL/L (ref 136–145)
WBC # BLD AUTO: 11 K/UL (ref 3.9–12.7)

## 2023-08-19 PROCEDURE — 80053 COMPREHEN METABOLIC PANEL: CPT | Performed by: EMERGENCY MEDICINE

## 2023-08-19 PROCEDURE — 99284 EMERGENCY DEPT VISIT MOD MDM: CPT | Mod: 25

## 2023-08-19 PROCEDURE — 96367 TX/PROPH/DG ADDL SEQ IV INF: CPT

## 2023-08-19 PROCEDURE — 86140 C-REACTIVE PROTEIN: CPT | Performed by: EMERGENCY MEDICINE

## 2023-08-19 PROCEDURE — 63600175 PHARM REV CODE 636 W HCPCS: Performed by: EMERGENCY MEDICINE

## 2023-08-19 PROCEDURE — 85025 COMPLETE CBC W/AUTO DIFF WBC: CPT | Performed by: EMERGENCY MEDICINE

## 2023-08-19 PROCEDURE — 83605 ASSAY OF LACTIC ACID: CPT | Performed by: EMERGENCY MEDICINE

## 2023-08-19 PROCEDURE — 96365 THER/PROPH/DIAG IV INF INIT: CPT

## 2023-08-19 PROCEDURE — 25000003 PHARM REV CODE 250: Performed by: EMERGENCY MEDICINE

## 2023-08-19 RX ORDER — LOPERAMIDE HYDROCHLORIDE 2 MG/1
2 CAPSULE ORAL 4 TIMES DAILY PRN
Qty: 12 CAPSULE | Refills: 0 | Status: SHIPPED | OUTPATIENT
Start: 2023-08-19 | End: 2023-08-19 | Stop reason: CLARIF

## 2023-08-19 RX ORDER — DICYCLOMINE HYDROCHLORIDE 20 MG/1
20 TABLET ORAL 2 TIMES DAILY PRN
Qty: 20 TABLET | Refills: 0 | Status: SHIPPED | OUTPATIENT
Start: 2023-08-19 | End: 2023-08-19 | Stop reason: CLARIF

## 2023-08-19 RX ORDER — DOXYCYCLINE 100 MG/1
100 CAPSULE ORAL 2 TIMES DAILY
Qty: 20 CAPSULE | Refills: 0 | Status: SHIPPED | OUTPATIENT
Start: 2023-08-19 | End: 2023-08-29

## 2023-08-19 RX ADMIN — DOXYCYCLINE 100 MG: 100 INJECTION, POWDER, LYOPHILIZED, FOR SOLUTION INTRAVENOUS at 05:08

## 2023-08-19 RX ADMIN — CEFTRIAXONE 1 G: 1 INJECTION, POWDER, FOR SOLUTION INTRAMUSCULAR; INTRAVENOUS at 04:08

## 2023-08-19 NOTE — DISCHARGE INSTRUCTIONS
Please follow-up with orthopedic office on Monday or Tuesday.  Return if you have fever or spreading redness or inability to move any of your fingers.    He is follow-up with your primary care doctor in the next 1 week regarding elevation of bilirubin levels in your blood.

## 2023-08-19 NOTE — ED TRIAGE NOTES
Pt presents to the ED for CC of possible hand infection. Pt went to  first and was referred to the ED for redness and swelling of his left hand. Pt states he first noticed the swelling and redness 2 days ago and it has gotten worse and now appears to be spreading upward. Pt states the hand does not itch but is tender. Pt digits appear pale on the affected hand but have <3 cap refill. Pt denies any injuries or trauma to the hand, he denies touching any weeds or flowers outside, denies new medications, lotions, or soaps. Pt does state he has a new small kitten at home that will sometimes scratch or bite him playfully. Kitten is up to date on shots. Pt denies chest pain, sob, fever, chills, NVD, other rashes. Pt is AAOX4, VS WDL, and in NAD at this moment. Pt wife at the bedside.

## 2023-08-19 NOTE — ED PROVIDER NOTES
Encounter Date: 8/19/2023    SCRIBE #1 NOTE: I, Randi Shafer, am scribing for, and in the presence of,  Donte Aranda Jr., MD. I have scribed the following portions of the note - Other sections scribed: HPI, ROS.       History     Chief Complaint   Patient presents with    Hand Pain     Pt reports red and swollen left hand.x 2 days. Pt is unaware of what happen.  Pt cannot bend fingers, +sensation + pulses.   Pt took tylenol 0700 this am.  Pt denies fever and chills.      CC: Hand Pain    HPI: This is a 73 y.o. male with PMHx of persistent A-Fib, presents to the ED with redness and swelling to the left hand for 2 days. Patient reports inability to bend the fingers and reports taking tylenol at 7 AM. Denies fever, chills, wound, or other associated symptoms.        The history is provided by the patient. No  was used.     Review of patient's allergies indicates:  No Known Allergies  No past medical history on file.  Past Surgical History:   Procedure Laterality Date    COLONOSCOPY N/A 10/21/2020    Procedure: COLONOSCOPY;  Surgeon: Gary Giles MD;  Location: [facility];  Service: Endoscopy;  Laterality: N/A;     No family history on file.  Social History     Tobacco Use    Smoking status: Some Days     Current packs/day: 0.00     Types: Vaping with nicotine    Smokeless tobacco: Never     Review of Systems   Constitutional:  Negative for chills and fever.   HENT:  Negative for congestion, sore throat and trouble swallowing.    Respiratory:  Negative for cough and shortness of breath.    Cardiovascular:  Negative for chest pain.   Gastrointestinal:  Negative for abdominal pain, constipation, diarrhea, nausea and vomiting.   Genitourinary:  Negative for dysuria, flank pain, frequency and urgency.   Musculoskeletal:  Positive for arthralgias (Left hand). Negative for back pain.   Skin:  Positive for color change (Redness to the left hand). Negative for rash and wound.   Neurological:   Negative for headaches.   All other systems reviewed and are negative.      Physical Exam     Initial Vitals [08/19/23 1539]   BP Pulse Resp Temp SpO2   138/65 98 18 98.3 °F (36.8 °C) 96 %      MAP       --         Physical Exam    Nursing note and vitals reviewed.  Constitutional: He appears well-developed and well-nourished. He is not diaphoretic. No distress.   HENT:   Head: Normocephalic and atraumatic.   Right Ear: External ear normal.   Left Ear: External ear normal.   Nose: Nose normal.   Mouth/Throat: Oropharynx is clear and moist.   Eyes: Conjunctivae and EOM are normal. Pupils are equal, round, and reactive to light. Right eye exhibits no discharge. Left eye exhibits no discharge. No scleral icterus.   Neck: Neck supple. No JVD present.   Normal range of motion.  Cardiovascular:  Normal rate and regular rhythm.           Pulmonary/Chest: No stridor. No respiratory distress.   Abdominal: He exhibits no distension.   Musculoskeletal:         General: Tenderness and edema present. Normal range of motion.      Cervical back: Normal range of motion and neck supple.      Comments: Inspection of the left hand reveals erythema and warmth with some edema noted to the distal metacarpals and also into the proximal portion of the 3rd digit.  There is tenderness to the area.       Neurological: He is alert and oriented to person, place, and time. He has normal strength. No cranial nerve deficit or sensory deficit.   Skin: Skin is warm and dry. No rash noted. No erythema. No pallor.   Psychiatric: He has a normal mood and affect. His behavior is normal. Judgment and thought content normal.         ED Course   Procedures  Labs Reviewed   CBC W/ AUTO DIFFERENTIAL - Abnormal; Notable for the following components:       Result Value    RBC 4.56 (*)     MCH 32.2 (*)     Platelets 148 (*)     Gran # (ANC) 8.1 (*)     Mono # 1.7 (*)     Gran % 73.6 (*)     Lymph % 10.3 (*)     Mono % 15.3 (*)     All other components within  normal limits   COMPREHENSIVE METABOLIC PANEL - Abnormal; Notable for the following components:    Total Bilirubin 2.9 (*)     All other components within normal limits   C-REACTIVE PROTEIN - Abnormal; Notable for the following components:    CRP 35.3 (*)     All other components within normal limits   LACTIC ACID, PLASMA          Imaging Results              X-Ray Hand 3 view Left (Final result)  Result time 08/19/23 16:40:36      Final result by Miguel Arceo MD (08/19/23 16:40:36)                   Impression:      1. No convincing acute displaced fracture or dislocation of the hand noting edema about the hand particularly involving the base of the 3rd digit.  Correlation is advised in this patient with provided history of cellulitis.      Electronically signed by: Miguel Arceo MD  Date:    08/19/2023  Time:    16:40               Narrative:    EXAMINATION:  XR HAND COMPLETE 3 VIEW LEFT    CLINICAL HISTORY:  cellulitis;.    TECHNIQUE:  PA, lateral, and oblique views of the left hand were performed.    COMPARISON:  None    FINDINGS:  Three views left hand.    There is osteopenia.  There are degenerative changes of the hand.  There is edema about the hand particularly involving the proximal aspect of the 3rd digit.  No radiopaque foreign body.                                    X-Rays:   Independently Interpreted Readings:   Other Readings:  X-ray of the left hand reveals soft tissue swelling without evidence of radiopaque foreign body.    Medications   doxycycline (VIBRAMYCIN) 100 mg in dextrose 5 % in water (D5W) 100 mL IVPB (MB+) (100 mg Intravenous New Bag 8/19/23 1701)   cefTRIAXone (ROCEPHIN) 1 g in dextrose 5 % in water (D5W) 100 mL IVPB (MB+) (0 g Intravenous Stopped 8/19/23 1700)     Medical Decision Making  This is the emergent evaluation of a 73-year-old male presents emergency department for evaluation of 2 days of pain and redness to the dorsum of his left hand.  Differential diagnosis at the  time of initial evaluation included, but was not limited to:  Cellulitis, abscess.   There is no fluctuance to suggest abscess.  There is no evidence of lymphangitic spread proximally to the area.  There is discomfort and swelling at the base of the 3rd digit of the left hand with tenderness with movement of the finger.  There is no circumferential swelling to suggest tenosynovitis.  No evidence of radiopaque foreign body on x-ray.  There is no leukocytosis.  No lactic acidosis.  Patient does have a increased elevation of his total bilirubin compared to prior.  I will have him follow up with his primary care physician to further evaluate this.  I have discussed this with the patient and his family member at the bedside.  I will start this patient on doxycycline by mouth.  Patient will follow up with Orthopedics next week.  I advised him to return if he gets worse or if new symptoms develop, especially if there is any spreading redness, inability to move his fingers, or fever.    Amount and/or Complexity of Data Reviewed  Labs: ordered.  Radiology: ordered.    Risk  Prescription drug management.            Scribe Attestation:   Scribe #1: I performed the above scribed service and the documentation accurately describes the services I performed. I attest to the accuracy of the note.                      I, Donte Aranda MD, personally performed the services described in this documentation. All medical record entries made by the scribe were at my direction and in my presence. I have reviewed the chart and agree that the record reflects my personal performance and is accurate and complete.    Clinical Impression:   Final diagnoses:  [L03.114] Cellulitis of left hand (Primary)  [E80.6] Hyperbilirubinemia        ED Disposition Condition    Discharge Stable          ED Prescriptions       Medication Sig Dispense Start Date End Date Auth. Provider    dicyclomine (BENTYL) 20 mg tablet  (Status: Discontinued) Take 1 tablet  (20 mg total) by mouth 2 (two) times daily as needed (abdominal cramping). 20 tablet 8/19/2023 8/19/2023 Donte Aranda Jr., MD    loperamide (IMODIUM) 2 mg capsule  (Status: Discontinued) Take 1 capsule (2 mg total) by mouth 4 (four) times daily as needed for Diarrhea. 12 capsule 8/19/2023 8/19/2023 Donte Aranda Jr., MD    doxycycline (VIBRAMYCIN) 100 MG Cap Take 1 capsule (100 mg total) by mouth 2 (two) times daily. for 10 days 20 capsule 8/19/2023 8/29/2023 Donte Aranda Jr., MD          Follow-up Information       Follow up With Specialties Details Why Contact Info    Thu Astudillo III, MD Orthopedic Surgery Schedule an appointment as soon as possible for a visit in 2 days  [address]  [address]  [facility] [address]  [phone]      Jose Hoffman MD Internal Medicine, Wound Care Schedule an appointment as soon as possible for a visit in 3 days  [address]  [facility] [address]  [phone]               Donte Aranda Jr., MD  08/19/23 1012

## 2023-08-21 ENCOUNTER — TELEPHONE (OUTPATIENT)
Dept: FAMILY MEDICINE | Facility: CLINIC | Age: 73
End: 2023-08-21
Payer: MEDICARE

## 2023-08-21 NOTE — TELEPHONE ENCOUNTER
----- Message from Ritu Zimmerman sent at 8/21/2023 10:46 AM CDT -----  Regarding: Isidro  [phone]  Type:  Sooner Appointment Request    Patient is requesting a sooner appointment.  Patient declined first available appointment listed as well as another facility and provider .  Patient will not accept being placed on the waitlist and is requesting a message be sent to doctor.    Name of Caller: Isidro wife    When is the first available appointment? 08/29    Symptoms: hospital fu/u    Would the patient rather a call back or a response via [facility]?  Call back     Best Call Back Number: [phone]    Additional Information: pt was seen at the hospital and was told to see provider in 3 days after discharge, pt wife will like call back from nurse

## 2023-09-01 ENCOUNTER — OFFICE VISIT (OUTPATIENT)
Dept: FAMILY MEDICINE | Facility: CLINIC | Age: 73
End: 2023-09-01
Payer: MEDICARE

## 2023-09-01 ENCOUNTER — LAB VISIT (OUTPATIENT)
Dept: LAB | Facility: HOSPITAL | Age: 73
End: 2023-09-01
Payer: MEDICARE

## 2023-09-01 VITALS
BODY MASS INDEX: 22.43 KG/M2 | HEART RATE: 63 BPM | RESPIRATION RATE: 16 BRPM | WEIGHT: 165.56 LBS | OXYGEN SATURATION: 96 % | TEMPERATURE: 98 F | HEIGHT: 72 IN | DIASTOLIC BLOOD PRESSURE: 62 MMHG | SYSTOLIC BLOOD PRESSURE: 128 MMHG

## 2023-09-01 DIAGNOSIS — R63.4 WEIGHT LOSS: ICD-10-CM

## 2023-09-01 DIAGNOSIS — L03.114 CELLULITIS OF LEFT HAND: Primary | ICD-10-CM

## 2023-09-01 DIAGNOSIS — R79.1 ABNORMAL COAGULATION PROFILE: ICD-10-CM

## 2023-09-01 DIAGNOSIS — I48.19 PERSISTENT ATRIAL FIBRILLATION: ICD-10-CM

## 2023-09-01 DIAGNOSIS — E80.6 HYPERBILIRUBINEMIA: ICD-10-CM

## 2023-09-01 LAB
ALBUMIN SERPL BCP-MCNC: 3.7 G/DL (ref 3.5–5.2)
ALP SERPL-CCNC: 72 U/L (ref 55–135)
ALT SERPL W/O P-5'-P-CCNC: 11 U/L (ref 10–44)
AST SERPL-CCNC: 16 U/L (ref 10–40)
BILIRUB DIRECT SERPL-MCNC: 0.5 MG/DL (ref 0.1–0.3)
BILIRUB DIRECT SERPL-MCNC: 0.5 MG/DL (ref 0.1–0.3)
BILIRUB SERPL-MCNC: 1.4 MG/DL (ref 0.1–1)
INR PPP: 1.1 (ref 0.8–1.2)
PROT SERPL-MCNC: 7.1 G/DL (ref 6–8.4)
PROTHROMBIN TIME: 11.8 SEC (ref 9–12.5)

## 2023-09-01 PROCEDURE — 1101F PR PT FALLS ASSESS DOC 0-1 FALLS W/OUT INJ PAST YR: ICD-10-PCS | Mod: CPTII,S$GLB,, | Performed by: NURSE PRACTITIONER

## 2023-09-01 PROCEDURE — 1160F RVW MEDS BY RX/DR IN RCRD: CPT | Mod: CPTII,S$GLB,, | Performed by: NURSE PRACTITIONER

## 2023-09-01 PROCEDURE — 80076 HEPATIC FUNCTION PANEL: CPT | Performed by: NURSE PRACTITIONER

## 2023-09-01 PROCEDURE — 99214 OFFICE O/P EST MOD 30 MIN: CPT | Mod: S$GLB,,, | Performed by: NURSE PRACTITIONER

## 2023-09-01 PROCEDURE — 3074F SYST BP LT 130 MM HG: CPT | Mod: CPTII,S$GLB,, | Performed by: NURSE PRACTITIONER

## 2023-09-01 PROCEDURE — 3288F FALL RISK ASSESSMENT DOCD: CPT | Mod: CPTII,S$GLB,, | Performed by: NURSE PRACTITIONER

## 2023-09-01 PROCEDURE — 1126F PR PAIN SEVERITY QUANTIFIED, NO PAIN PRESENT: ICD-10-PCS | Mod: CPTII,S$GLB,, | Performed by: NURSE PRACTITIONER

## 2023-09-01 PROCEDURE — 4010F PR ACE/ARB THEARPY RXD/TAKEN: ICD-10-PCS | Mod: CPTII,S$GLB,, | Performed by: NURSE PRACTITIONER

## 2023-09-01 PROCEDURE — 85610 PROTHROMBIN TIME: CPT | Performed by: NURSE PRACTITIONER

## 2023-09-01 PROCEDURE — 1126F AMNT PAIN NOTED NONE PRSNT: CPT | Mod: CPTII,S$GLB,, | Performed by: NURSE PRACTITIONER

## 2023-09-01 PROCEDURE — 99214 PR OFFICE/OUTPT VISIT, EST, LEVL IV, 30-39 MIN: ICD-10-PCS | Mod: S$GLB,,, | Performed by: NURSE PRACTITIONER

## 2023-09-01 PROCEDURE — 3078F PR MOST RECENT DIASTOLIC BLOOD PRESSURE < 80 MM HG: ICD-10-PCS | Mod: CPTII,S$GLB,, | Performed by: NURSE PRACTITIONER

## 2023-09-01 PROCEDURE — 1101F PT FALLS ASSESS-DOCD LE1/YR: CPT | Mod: CPTII,S$GLB,, | Performed by: NURSE PRACTITIONER

## 2023-09-01 PROCEDURE — 3078F DIAST BP <80 MM HG: CPT | Mod: CPTII,S$GLB,, | Performed by: NURSE PRACTITIONER

## 2023-09-01 PROCEDURE — 1159F PR MEDICATION LIST DOCUMENTED IN MEDICAL RECORD: ICD-10-PCS | Mod: CPTII,S$GLB,, | Performed by: NURSE PRACTITIONER

## 2023-09-01 PROCEDURE — 3288F PR FALLS RISK ASSESSMENT DOCUMENTED: ICD-10-PCS | Mod: CPTII,S$GLB,, | Performed by: NURSE PRACTITIONER

## 2023-09-01 PROCEDURE — 36415 COLL VENOUS BLD VENIPUNCTURE: CPT | Mod: PN | Performed by: NURSE PRACTITIONER

## 2023-09-01 PROCEDURE — 99999 PR PBB SHADOW E&M-EST. PATIENT-LVL III: ICD-10-PCS | Mod: PBBFAC,,, | Performed by: NURSE PRACTITIONER

## 2023-09-01 PROCEDURE — 99499 UNLISTED E&M SERVICE: CPT | Mod: S$GLB,,, | Performed by: NURSE PRACTITIONER

## 2023-09-01 PROCEDURE — 3074F PR MOST RECENT SYSTOLIC BLOOD PRESSURE < 130 MM HG: ICD-10-PCS | Mod: CPTII,S$GLB,, | Performed by: NURSE PRACTITIONER

## 2023-09-01 PROCEDURE — 4010F ACE/ARB THERAPY RXD/TAKEN: CPT | Mod: CPTII,S$GLB,, | Performed by: NURSE PRACTITIONER

## 2023-09-01 PROCEDURE — 1159F MED LIST DOCD IN RCRD: CPT | Mod: CPTII,S$GLB,, | Performed by: NURSE PRACTITIONER

## 2023-09-01 PROCEDURE — 99999 PR PBB SHADOW E&M-EST. PATIENT-LVL III: CPT | Mod: PBBFAC,,, | Performed by: NURSE PRACTITIONER

## 2023-09-01 PROCEDURE — 1160F PR REVIEW ALL MEDS BY PRESCRIBER/CLIN PHARMACIST DOCUMENTED: ICD-10-PCS | Mod: CPTII,S$GLB,, | Performed by: NURSE PRACTITIONER

## 2023-09-01 NOTE — PROGRESS NOTES
Routine Office Visit    Patient Name: Ryan Kenney    : 1950  MRN: [id number]    Chief Complaint:  ED Follow-up    Subjective:  Ryan is a 73 y.o. male who presents today for:    1. ED follow-up - patient who is new to me with a history of heart failure, AFib on Eliquis (stopped warfarin 1.5 months ago from his cardiology team) reports today with his wife for evaluation.  Recently went to the emergency room for cellulitis of the left hand.  Had negative imaging there was treated with antibiotics which he has taken to completion.  He reports that the hand swelling has resolved.  Denies any numbness or tingling or other issues with the joints of the left upper extremity.    His wife who is with him states that the patient has had a significantly decreased appetite as of late.  He denies any significant abdominal concerns but has reported a loss of appetite in the last 3-6 months.  He notes about a 10 lb weight loss at home.  He states he believes this is due to him being retired and not eating as much.    Note is made of hyperbilirubinemia from the ER.  Patient denies any hepatic or gallbladder disease in the past.  Denies any jaundice or skin changes.    Past Medical History  History reviewed. No pertinent past medical history.    Past Surgical History  Past Surgical History:   Procedure Laterality Date    COLONOSCOPY N/A 10/21/2020    Procedure: COLONOSCOPY;  Surgeon: Gary Giles MD;  Location: [facility];  Service: Endoscopy;  Laterality: N/A;       Family History  No family history on file.    Social History  Social History     Socioeconomic History    Marital status:    Tobacco Use    Smoking status: Some Days     Types: Vaping with nicotine    Smokeless tobacco: Never       Current Medications  Current Outpatient Medications on File Prior to Visit   Medication Sig Dispense Refill    apixaban (ELIQUIS) 5 mg Tab Take 5 mg by mouth 2 (two) times daily.      ENTRESTO 24-26 mg per tablet Take 1  tablet by mouth 2 (two) times daily.      furosemide (LASIX) 40 MG tablet Take 40 mg by mouth once daily.      loratadine (CLARITIN) 10 mg tablet Take 1 tablet (10 mg total) by mouth daily as needed for Allergies. 90 tablet 3    [DISCONTINUED] warfarin (COUMADIN) 5 MG tablet Take by mouth.       No current facility-administered medications on file prior to visit.       Allergies   Review of patient's allergies indicates:  No Known Allergies    Review of Systems (Pertinent positives)  Review of Systems   Constitutional:  Positive for weight loss. Negative for chills and fever.   HENT: Negative.  Negative for congestion, sinus pain and sore throat.    Eyes: Negative.    Respiratory:  Negative for cough, shortness of breath and wheezing.    Cardiovascular:  Negative for chest pain, palpitations, orthopnea and claudication.   Gastrointestinal: Negative.  Negative for abdominal pain, diarrhea, nausea and vomiting.   Genitourinary: Negative.  Negative for dysuria, frequency and urgency.   Musculoskeletal: Negative.  Negative for back pain, joint pain and neck pain.   Skin: Negative.    Neurological: Negative.  Negative for dizziness, tingling, loss of consciousness and headaches.   Endo/Heme/Allergies: Negative.    Psychiatric/Behavioral: Negative.         /62 (BP Location: Left arm, Patient Position: Sitting, BP Method: Large (Manual))   Pulse 63   Temp 98.3 °F (36.8 °C) (Oral)   Resp 16   Ht 6' (1.829 m)   Wt 75.1 kg (165 lb 9.1 oz)   SpO2 96%   BMI 22.45 kg/m²     Physical Exam  Vitals reviewed.   Constitutional:       General: He is not in acute distress.     Appearance: Normal appearance. He is not ill-appearing, toxic-appearing or diaphoretic.   HENT:      Head: Normocephalic and atraumatic.   Eyes:      Conjunctiva/sclera: Conjunctivae normal.   Cardiovascular:      Rate and Rhythm: Normal rate and regular rhythm.      Pulses: Normal pulses.      Heart sounds: Normal heart sounds.   Pulmonary:       Effort: Pulmonary effort is normal. No respiratory distress.      Breath sounds: Normal breath sounds. No wheezing.   Abdominal:      General: Bowel sounds are normal. There is no distension.      Palpations: Abdomen is soft.      Tenderness: There is no abdominal tenderness.   Musculoskeletal:         General: No swelling, tenderness or deformity. Normal range of motion.      Left forearm: Normal.      Left wrist: Normal.      Left hand: Normal.      Comments: Left hand normal without any abnormalities.  Normal pulses bilateral upper extremities normal cap refill   Skin:     General: Skin is warm and dry.      Capillary Refill: Capillary refill takes less than 2 seconds.      Coloration: Skin is not jaundiced.   Neurological:      General: No focal deficit present.      Mental Status: He is alert and oriented to person, place, and time.   Psychiatric:         Mood and Affect: Mood normal.         Behavior: Behavior normal.          Assessment/Plan:  Ryan Kenney is a 73 y.o. male who presents today for :    Ryan was seen today for follow-up.    Diagnoses and all orders for this visit:    Cellulitis of left hand    Hyperbilirubinemia  -     Hepatic Function Panel; Future  -     BILIRUBIN, DIRECT; Future  -     PROTIME-INR; Future    Abnormal coagulation profile  -     PROTIME-INR; Future    Weight loss    Persistent atrial fibrillation - on Coumadin - monitored regularly at Cardiology's office @     I had a lengthy and detailed discussion with the patient regarding his symptoms.  He reports some weight loss in the last 3-6 months but does he does not seem overtly concerned about this.  I did offer referral to our Gastroenterology office but patient declined this.  He would like to monitor his symptoms for now.      His cellulitis is resolved.  No need for antibiotics.    Check labs including PT INR to evaluate hyperbilirubinemia, liver disease etc.  May need to consider abdominal ultrasound or CT imaging if  still elevated.    All questions answered. F/U for any persistent issues with weight loss.        This office note has been dictated.  This dictation has been generated using M-Modal Fluency Direct dictation; some phonetic errors may occur.   My collaborating physician is Dr. Kemal Stein.

## 2023-09-08 ENCOUNTER — TELEPHONE (OUTPATIENT)
Dept: FAMILY MEDICINE | Facility: CLINIC | Age: 73
End: 2023-09-08
Payer: MEDICARE

## 2023-09-08 NOTE — TELEPHONE ENCOUNTER
----- Message from Cristian Bruner NP sent at 9/6/2023  7:41 AM CDT -----  Please call patient and let him know that his labs look better.  Bilirubin level is improved.  No need for further workup at this time.  He should monitor for any abdominal pain or other symptoms as we had discussed.

## 2024-01-16 ENCOUNTER — PATIENT OUTREACH (OUTPATIENT)
Dept: ADMINISTRATIVE | Facility: HOSPITAL | Age: 74
End: 2024-01-16
Payer: MEDICARE

## 2024-01-16 NOTE — PROGRESS NOTES
Portal message sent out to pt in regards to scheduling an OV.Gap report updated.Immunization's updated/triggered.

## 2024-02-07 DIAGNOSIS — E11.9 TYPE 2 DIABETES MELLITUS WITHOUT COMPLICATION: ICD-10-CM

## 2024-05-31 ENCOUNTER — OFFICE VISIT (OUTPATIENT)
Dept: FAMILY MEDICINE | Facility: CLINIC | Age: 74
End: 2024-05-31
Payer: MEDICARE

## 2024-05-31 ENCOUNTER — LAB VISIT (OUTPATIENT)
Dept: LAB | Facility: HOSPITAL | Age: 74
End: 2024-05-31
Attending: NURSE PRACTITIONER
Payer: MEDICARE

## 2024-05-31 VITALS
OXYGEN SATURATION: 97 % | SYSTOLIC BLOOD PRESSURE: 130 MMHG | HEART RATE: 64 BPM | TEMPERATURE: 98 F | WEIGHT: 168.19 LBS | DIASTOLIC BLOOD PRESSURE: 68 MMHG | HEIGHT: 72 IN | BODY MASS INDEX: 22.78 KG/M2

## 2024-05-31 DIAGNOSIS — I48.19 PERSISTENT ATRIAL FIBRILLATION: ICD-10-CM

## 2024-05-31 DIAGNOSIS — I50.22 CHRONIC SYSTOLIC CONGESTIVE HEART FAILURE: Primary | ICD-10-CM

## 2024-05-31 DIAGNOSIS — G89.29 CHRONIC RIGHT-SIDED THORACIC BACK PAIN: ICD-10-CM

## 2024-05-31 DIAGNOSIS — I50.22 CHRONIC SYSTOLIC CONGESTIVE HEART FAILURE: ICD-10-CM

## 2024-05-31 DIAGNOSIS — M54.6 CHRONIC RIGHT-SIDED THORACIC BACK PAIN: ICD-10-CM

## 2024-05-31 LAB
ALBUMIN SERPL BCP-MCNC: 3.8 G/DL (ref 3.5–5.2)
ALBUMIN SERPL BCP-MCNC: 3.8 G/DL (ref 3.5–5.2)
ALP SERPL-CCNC: 65 U/L (ref 55–135)
ALP SERPL-CCNC: 65 U/L (ref 55–135)
ALT SERPL W/O P-5'-P-CCNC: 10 U/L (ref 10–44)
ALT SERPL W/O P-5'-P-CCNC: 10 U/L (ref 10–44)
ANION GAP SERPL CALC-SCNC: 7 MMOL/L (ref 8–16)
AST SERPL-CCNC: 18 U/L (ref 10–40)
AST SERPL-CCNC: 18 U/L (ref 10–40)
BASOPHILS # BLD AUTO: 0.04 K/UL (ref 0–0.2)
BASOPHILS NFR BLD: 1 % (ref 0–1.9)
BILIRUB DIRECT SERPL-MCNC: 0.5 MG/DL (ref 0.1–0.3)
BILIRUB SERPL-MCNC: 1.4 MG/DL (ref 0.1–1)
BILIRUB SERPL-MCNC: 1.4 MG/DL (ref 0.1–1)
BUN SERPL-MCNC: 19 MG/DL (ref 8–23)
CALCIUM SERPL-MCNC: 9.5 MG/DL (ref 8.7–10.5)
CHLORIDE SERPL-SCNC: 105 MMOL/L (ref 95–110)
CO2 SERPL-SCNC: 28 MMOL/L (ref 23–29)
CREAT SERPL-MCNC: 1.1 MG/DL (ref 0.5–1.4)
DIFFERENTIAL METHOD BLD: ABNORMAL
EOSINOPHIL # BLD AUTO: 0.1 K/UL (ref 0–0.5)
EOSINOPHIL NFR BLD: 3.5 % (ref 0–8)
ERYTHROCYTE [DISTWIDTH] IN BLOOD BY AUTOMATED COUNT: 13.2 % (ref 11.5–14.5)
EST. GFR  (NO RACE VARIABLE): >60 ML/MIN/1.73 M^2
GLUCOSE SERPL-MCNC: 105 MG/DL (ref 70–110)
HCT VFR BLD AUTO: 46.1 % (ref 40–54)
HGB BLD-MCNC: 14.4 G/DL (ref 14–18)
IMM GRANULOCYTES # BLD AUTO: 0.01 K/UL (ref 0–0.04)
IMM GRANULOCYTES NFR BLD AUTO: 0.2 % (ref 0–0.5)
LYMPHOCYTES # BLD AUTO: 1 K/UL (ref 1–4.8)
LYMPHOCYTES NFR BLD: 24.6 % (ref 18–48)
MCH RBC QN AUTO: 31.9 PG (ref 27–31)
MCHC RBC AUTO-ENTMCNC: 31.2 G/DL (ref 32–36)
MCV RBC AUTO: 102 FL (ref 82–98)
MONOCYTES # BLD AUTO: 0.6 K/UL (ref 0.3–1)
MONOCYTES NFR BLD: 14.9 % (ref 4–15)
NEUTROPHILS # BLD AUTO: 2.2 K/UL (ref 1.8–7.7)
NEUTROPHILS NFR BLD: 55.8 % (ref 38–73)
NRBC BLD-RTO: 0 /100 WBC
PLATELET # BLD AUTO: 148 K/UL (ref 150–450)
PMV BLD AUTO: 11.2 FL (ref 9.2–12.9)
POTASSIUM SERPL-SCNC: 4.3 MMOL/L (ref 3.5–5.1)
PROT SERPL-MCNC: 7 G/DL (ref 6–8.4)
PROT SERPL-MCNC: 7 G/DL (ref 6–8.4)
RBC # BLD AUTO: 4.52 M/UL (ref 4.6–6.2)
SODIUM SERPL-SCNC: 140 MMOL/L (ref 136–145)
WBC # BLD AUTO: 4.02 K/UL (ref 3.9–12.7)

## 2024-05-31 PROCEDURE — 99214 OFFICE O/P EST MOD 30 MIN: CPT | Mod: S$GLB,,, | Performed by: NURSE PRACTITIONER

## 2024-05-31 PROCEDURE — 1159F MED LIST DOCD IN RCRD: CPT | Mod: CPTII,S$GLB,, | Performed by: NURSE PRACTITIONER

## 2024-05-31 PROCEDURE — 1101F PT FALLS ASSESS-DOCD LE1/YR: CPT | Mod: CPTII,S$GLB,, | Performed by: NURSE PRACTITIONER

## 2024-05-31 PROCEDURE — 36415 COLL VENOUS BLD VENIPUNCTURE: CPT | Mod: PN | Performed by: NURSE PRACTITIONER

## 2024-05-31 PROCEDURE — 3288F FALL RISK ASSESSMENT DOCD: CPT | Mod: CPTII,S$GLB,, | Performed by: NURSE PRACTITIONER

## 2024-05-31 PROCEDURE — 3078F DIAST BP <80 MM HG: CPT | Mod: CPTII,S$GLB,, | Performed by: NURSE PRACTITIONER

## 2024-05-31 PROCEDURE — 1160F RVW MEDS BY RX/DR IN RCRD: CPT | Mod: CPTII,S$GLB,, | Performed by: NURSE PRACTITIONER

## 2024-05-31 PROCEDURE — 85025 COMPLETE CBC W/AUTO DIFF WBC: CPT | Performed by: NURSE PRACTITIONER

## 2024-05-31 PROCEDURE — 1126F AMNT PAIN NOTED NONE PRSNT: CPT | Mod: CPTII,S$GLB,, | Performed by: NURSE PRACTITIONER

## 2024-05-31 PROCEDURE — 80053 COMPREHEN METABOLIC PANEL: CPT | Performed by: NURSE PRACTITIONER

## 2024-05-31 PROCEDURE — 99999 PR PBB SHADOW E&M-EST. PATIENT-LVL IV: CPT | Mod: PBBFAC,,, | Performed by: NURSE PRACTITIONER

## 2024-05-31 PROCEDURE — 3075F SYST BP GE 130 - 139MM HG: CPT | Mod: CPTII,S$GLB,, | Performed by: NURSE PRACTITIONER

## 2024-05-31 NOTE — PROGRESS NOTES
Routine Office Visit    Patient Name: Ryan Kenney    : 1950  MRN: [id number]    Chief Complaint:  Back pain    Subjective:  Ryan is a 73 y.o. male who presents today for:    Back pain - patient reports today with his wife for evaluation.  Has a history of CHF and AFib followed by outside cardiologist.  He is compliant with his current medications.  Cardiac-wise he is stable.  States for the last 2 months or so having some chronic aching right-sided thoracic back pain.  Mostly constant but tolerable and relieved with [place].  Denies any radiation of the pain.  Denies any paresthesias of the upper or lower extremities.  No shortness of breath or chest pain.  Denies any other acute concerns.    Past Medical History  History reviewed. No pertinent past medical history.    Family History  No family history on file.    Current Medications  Current Outpatient Medications on File Prior to Visit   Medication Sig Dispense Refill    apixaban (ELIQUIS) 5 mg Tab Take 5 mg by mouth 2 (two) times daily.      ENTRESTO 24-26 mg per tablet Take 1 tablet by mouth 2 (two) times daily.      furosemide (LASIX) 40 MG tablet Take 40 mg by mouth once daily.      loratadine (CLARITIN) 10 mg tablet Take 1 tablet (10 mg total) by mouth daily as needed for Allergies. (Patient not taking: Reported on 2024) 90 tablet 3     No current facility-administered medications on file prior to visit.       Allergies   Review of patient's allergies indicates:  No Known Allergies    Review of Systems (Pertinent positives)  Review of Systems   Constitutional: Negative.  Negative for chills and fever.   HENT: Negative.  Negative for congestion, sinus pain and sore throat.    Eyes: Negative.    Respiratory:  Negative for cough, shortness of breath and wheezing.    Cardiovascular:  Negative for chest pain, palpitations, orthopnea and claudication.   Gastrointestinal: Negative.  Negative for abdominal pain, diarrhea, nausea and vomiting.    Genitourinary: Negative.  Negative for dysuria, frequency and urgency.   Musculoskeletal:  Positive for back pain. Negative for falls, joint pain, myalgias and neck pain.   Skin: Negative.    Neurological: Negative.  Negative for dizziness, tingling, loss of consciousness and headaches.   Endo/Heme/Allergies: Negative.    Psychiatric/Behavioral: Negative.         /68 (BP Location: Left arm, Patient Position: Sitting, BP Method: Medium (Manual))   Pulse 64   Temp 97.7 °F (36.5 °C) (Oral)   Ht 6' (1.829 m)   Wt 76.3 kg (168 lb 3.4 oz)   SpO2 97%   BMI 22.81 kg/m²     Physical Exam  Vitals reviewed.   Constitutional:       General: He is not in acute distress.     Appearance: Normal appearance. He is not ill-appearing, toxic-appearing or diaphoretic.   HENT:      Head: Normocephalic and atraumatic.   Cardiovascular:      Rate and Rhythm: Normal rate and regular rhythm.      Pulses: Normal pulses.      Heart sounds: Normal heart sounds.   Pulmonary:      Effort: Pulmonary effort is normal. No respiratory distress.      Breath sounds: Normal breath sounds. No wheezing.   Musculoskeletal:         General: No swelling, tenderness or deformity. Normal range of motion.      Cervical back: Normal.      Thoracic back: Normal.   Skin:     General: Skin is warm and dry.      Capillary Refill: Capillary refill takes less than 2 seconds.   Neurological:      General: No focal deficit present.      Mental Status: He is alert and oriented to person, place, and time.   Psychiatric:         Mood and Affect: Mood normal.         Behavior: Behavior normal.          Assessment/Plan:  Ryan Kenney is a 73 y.o. male who presents today for :    Ryan was seen today for back pain.    Diagnoses and all orders for this visit:    Chronic systolic congestive heart failure  -     COMPREHENSIVE METABOLIC PANEL; Future  -     HEPATIC FUNCTION PANEL; Future  -     CBC W/ AUTO DIFFERENTIAL; Future    Stable on Eliquis and Entresto  and Lasix.  Followed by cardiology.  Clinically euvolemic today.  Will check labs per patient's wife request.    Persistent atrial fibrillation  -     COMPREHENSIVE METABOLIC PANEL; Future  -     HEPATIC FUNCTION PANEL; Future  -     CBC W/ AUTO DIFFERENTIAL; Future    As above.  Regular rhythm today.  Stable on current meds.    Chronic right-sided thoracic back pain    We discussed potential causes.  Pain seems more muscular in nature at this time.  Grossly normal examination of the thoracic and cervical spines.  Offered x-ray imaging but patient declined.  Recommended physical therapy as well but patient declined.  Recommended patient to limit NSAID use given that he is on anticoagulants.  Patient can use Salonpas, tylenol,  or topical heat if needed.  Follow-up for any worsening.    Patient was given the number to schedule an appointment with a primary doctor to establish care.  He states he will schedule this on his own.        This office note has been dictated.  This dictation has been generated using M-Modal Fluency Direct dictation; some phonetic errors may occur.

## 2025-01-01 ENCOUNTER — ANESTHESIA EVENT (OUTPATIENT)
Dept: ENDOSCOPY | Facility: HOSPITAL | Age: 75
End: 2025-01-01
Payer: MEDICARE

## 2025-01-01 ENCOUNTER — ANESTHESIA (OUTPATIENT)
Dept: ENDOSCOPY | Facility: HOSPITAL | Age: 75
End: 2025-01-01
Payer: MEDICARE

## 2025-01-01 PROCEDURE — 25000003 PHARM REV CODE 250

## 2025-01-01 PROCEDURE — 63600175 PHARM REV CODE 636 W HCPCS

## 2025-01-01 RX ORDER — SUCCINYLCHOLINE CHLORIDE 20 MG/ML
INJECTION INTRAMUSCULAR; INTRAVENOUS
Status: DISCONTINUED | OUTPATIENT
Start: 2025-01-01 | End: 2025-01-01

## 2025-01-01 RX ORDER — ONDANSETRON HYDROCHLORIDE 2 MG/ML
INJECTION, SOLUTION INTRAVENOUS
Status: DISCONTINUED | OUTPATIENT
Start: 2025-01-01 | End: 2025-01-01

## 2025-01-01 RX ORDER — PHENYLEPHRINE HYDROCHLORIDE 10 MG/ML
INJECTION INTRAVENOUS
Status: DISCONTINUED | OUTPATIENT
Start: 2025-01-01 | End: 2025-01-01

## 2025-01-01 RX ORDER — ETOMIDATE 2 MG/ML
INJECTION INTRAVENOUS
Status: DISCONTINUED | OUTPATIENT
Start: 2025-01-01 | End: 2025-01-01

## 2025-01-01 RX ORDER — LIDOCAINE HYDROCHLORIDE 20 MG/ML
INJECTION INTRAVENOUS
Status: DISCONTINUED | OUTPATIENT
Start: 2025-01-01 | End: 2025-01-01

## 2025-01-01 RX ORDER — ROCURONIUM BROMIDE 10 MG/ML
INJECTION, SOLUTION INTRAVENOUS
Status: DISCONTINUED | OUTPATIENT
Start: 2025-01-01 | End: 2025-01-01

## 2025-01-01 RX ORDER — PROPOFOL 10 MG/ML
VIAL (ML) INTRAVENOUS
Status: DISCONTINUED | OUTPATIENT
Start: 2025-01-01 | End: 2025-01-01

## 2025-01-01 RX ADMIN — PROPOFOL 50 MG: 10 INJECTION, EMULSION INTRAVENOUS at 03:09

## 2025-01-01 RX ADMIN — LIDOCAINE HYDROCHLORIDE 60 MG: 20 INJECTION, SOLUTION INTRAVENOUS at 03:09

## 2025-01-01 RX ADMIN — SUCCINYLCHOLINE CHLORIDE 100 MG: 20 INJECTION, SOLUTION INTRAMUSCULAR; INTRAVENOUS at 03:09

## 2025-01-01 RX ADMIN — PHENYLEPHRINE HYDROCHLORIDE 100 MCG: 10 INJECTION INTRAVENOUS at 04:09

## 2025-01-01 RX ADMIN — SODIUM CHLORIDE: 0.9 INJECTION, SOLUTION INTRAVENOUS at 03:09

## 2025-01-01 RX ADMIN — ROCURONIUM BROMIDE 20 MG: 10 INJECTION INTRAVENOUS at 03:09

## 2025-01-01 RX ADMIN — SUGAMMADEX 200 MG: 100 INJECTION, SOLUTION INTRAVENOUS at 04:09

## 2025-01-01 RX ADMIN — ONDANSETRON 4 MG: 2 INJECTION, SOLUTION INTRAMUSCULAR; INTRAVENOUS at 03:09

## 2025-01-01 RX ADMIN — ETOMIDATE 10 MG: 2 INJECTION, SOLUTION INTRAVENOUS at 03:09

## 2025-06-02 ENCOUNTER — TELEPHONE (OUTPATIENT)
Dept: FAMILY MEDICINE | Facility: CLINIC | Age: 75
End: 2025-06-02
Payer: MEDICARE

## 2025-06-10 ENCOUNTER — OFFICE VISIT (OUTPATIENT)
Dept: FAMILY MEDICINE | Facility: CLINIC | Age: 75
End: 2025-06-10
Payer: MEDICARE

## 2025-06-10 ENCOUNTER — LAB VISIT (OUTPATIENT)
Dept: LAB | Facility: HOSPITAL | Age: 75
End: 2025-06-10
Attending: NURSE PRACTITIONER
Payer: MEDICARE

## 2025-06-10 VITALS
SYSTOLIC BLOOD PRESSURE: 110 MMHG | HEIGHT: 72 IN | HEART RATE: 67 BPM | RESPIRATION RATE: 18 BRPM | OXYGEN SATURATION: 95 % | WEIGHT: 170.19 LBS | DIASTOLIC BLOOD PRESSURE: 60 MMHG | BODY MASS INDEX: 23.05 KG/M2 | TEMPERATURE: 98 F

## 2025-06-10 DIAGNOSIS — R73.03 PREDIABETES: ICD-10-CM

## 2025-06-10 DIAGNOSIS — Z12.5 SCREENING FOR MALIGNANT NEOPLASM OF PROSTATE: ICD-10-CM

## 2025-06-10 DIAGNOSIS — I50.22 CHRONIC SYSTOLIC CONGESTIVE HEART FAILURE: Primary | ICD-10-CM

## 2025-06-10 DIAGNOSIS — R63.4 WEIGHT LOSS, UNINTENTIONAL: ICD-10-CM

## 2025-06-10 DIAGNOSIS — I50.22 CHRONIC SYSTOLIC CONGESTIVE HEART FAILURE: ICD-10-CM

## 2025-06-10 LAB
ABSOLUTE EOSINOPHIL (OHS): 0.14 K/UL
ABSOLUTE MONOCYTE (OHS): 0.64 K/UL (ref 0.3–1)
ABSOLUTE NEUTROPHIL COUNT (OHS): 2 K/UL (ref 1.8–7.7)
ALBUMIN SERPL BCP-MCNC: 3.6 G/DL (ref 3.5–5.2)
ALP SERPL-CCNC: 71 UNIT/L (ref 40–150)
ALT SERPL W/O P-5'-P-CCNC: 11 UNIT/L (ref 10–44)
ANION GAP (OHS): 11 MMOL/L (ref 8–16)
AST SERPL-CCNC: 17 UNIT/L (ref 11–45)
BASOPHILS # BLD AUTO: 0.02 K/UL
BASOPHILS NFR BLD AUTO: 0.5 %
BILIRUB SERPL-MCNC: 1.5 MG/DL (ref 0.1–1)
BILIRUB UR QL STRIP.AUTO: NEGATIVE
BNP SERPL-MCNC: 392 PG/ML (ref 0–99)
BUN SERPL-MCNC: 21 MG/DL (ref 8–23)
CALCIUM SERPL-MCNC: 9.1 MG/DL (ref 8.7–10.5)
CHLORIDE SERPL-SCNC: 102 MMOL/L (ref 95–110)
CHOLEST SERPL-MCNC: 129 MG/DL (ref 120–199)
CHOLEST/HDLC SERPL: 2.7 {RATIO} (ref 2–5)
CLARITY UR: CLEAR
CO2 SERPL-SCNC: 31 MMOL/L (ref 23–29)
COLOR UR AUTO: YELLOW
CREAT SERPL-MCNC: 0.9 MG/DL (ref 0.5–1.4)
CRP SERPL-MCNC: 1 MG/L
EAG (OHS): 120 MG/DL (ref 68–131)
ERYTHROCYTE [DISTWIDTH] IN BLOOD BY AUTOMATED COUNT: 12.8 % (ref 11.5–14.5)
ERYTHROCYTE [SEDIMENTATION RATE] IN BLOOD BY PHOTOMETRIC METHOD: 7 MM/HR
GFR SERPLBLD CREATININE-BSD FMLA CKD-EPI: >60 ML/MIN/1.73/M2
GLUCOSE SERPL-MCNC: 102 MG/DL (ref 70–110)
GLUCOSE UR QL STRIP: NEGATIVE
HBA1C MFR BLD: 5.8 % (ref 4–5.6)
HCT VFR BLD AUTO: 45.5 % (ref 40–54)
HCV AB SERPL QL IA: NORMAL
HDLC SERPL-MCNC: 47 MG/DL (ref 40–75)
HDLC SERPL: 36.4 % (ref 20–50)
HGB BLD-MCNC: 14.5 GM/DL (ref 14–18)
HGB UR QL STRIP: NEGATIVE
HIV 1+2 AB+HIV1 P24 AG SERPL QL IA: NORMAL
IMM GRANULOCYTES # BLD AUTO: 0.01 K/UL (ref 0–0.04)
IMM GRANULOCYTES NFR BLD AUTO: 0.3 % (ref 0–0.5)
KETONES UR QL STRIP: NEGATIVE
LDLC SERPL CALC-MCNC: 73.4 MG/DL (ref 63–159)
LEUKOCYTE ESTERASE UR QL STRIP: NEGATIVE
LYMPHOCYTES # BLD AUTO: 0.88 K/UL (ref 1–4.8)
MCH RBC QN AUTO: 32.4 PG (ref 27–31)
MCHC RBC AUTO-ENTMCNC: 31.9 G/DL (ref 32–36)
MCV RBC AUTO: 102 FL (ref 82–98)
NITRITE UR QL STRIP: NEGATIVE
NONHDLC SERPL-MCNC: 82 MG/DL
NUCLEATED RBC (/100WBC) (OHS): 0 /100 WBC
PH UR STRIP: 6 [PH]
PLATELET # BLD AUTO: 131 K/UL (ref 150–450)
PMV BLD AUTO: 10.7 FL (ref 9.2–12.9)
POTASSIUM SERPL-SCNC: 4.2 MMOL/L (ref 3.5–5.1)
PROT SERPL-MCNC: 7.1 GM/DL (ref 6–8.4)
PROT UR QL STRIP: NEGATIVE
PSA SERPL-MCNC: 0.78 NG/ML
RBC # BLD AUTO: 4.47 M/UL (ref 4.6–6.2)
RELATIVE EOSINOPHIL (OHS): 3.8 %
RELATIVE LYMPHOCYTE (OHS): 23.8 % (ref 18–48)
RELATIVE MONOCYTE (OHS): 17.3 % (ref 4–15)
RELATIVE NEUTROPHIL (OHS): 54.3 % (ref 38–73)
SODIUM SERPL-SCNC: 144 MMOL/L (ref 136–145)
SP GR UR STRIP: 1.02
TRIGL SERPL-MCNC: 43 MG/DL (ref 30–150)
TSH SERPL-ACNC: 1.75 UIU/ML (ref 0.4–4)
UROBILINOGEN UR STRIP-ACNC: NEGATIVE EU/DL
WBC # BLD AUTO: 3.69 K/UL (ref 3.9–12.7)

## 2025-06-10 PROCEDURE — 82465 ASSAY BLD/SERUM CHOLESTEROL: CPT

## 2025-06-10 PROCEDURE — 3078F DIAST BP <80 MM HG: CPT | Mod: CPTII,S$GLB,, | Performed by: NURSE PRACTITIONER

## 2025-06-10 PROCEDURE — 87389 HIV-1 AG W/HIV-1&-2 AB AG IA: CPT

## 2025-06-10 PROCEDURE — G2211 COMPLEX E/M VISIT ADD ON: HCPCS | Mod: S$GLB,,, | Performed by: NURSE PRACTITIONER

## 2025-06-10 PROCEDURE — 3288F FALL RISK ASSESSMENT DOCD: CPT | Mod: CPTII,S$GLB,, | Performed by: NURSE PRACTITIONER

## 2025-06-10 PROCEDURE — 1159F MED LIST DOCD IN RCRD: CPT | Mod: CPTII,S$GLB,, | Performed by: NURSE PRACTITIONER

## 2025-06-10 PROCEDURE — 1101F PT FALLS ASSESS-DOCD LE1/YR: CPT | Mod: CPTII,S$GLB,, | Performed by: NURSE PRACTITIONER

## 2025-06-10 PROCEDURE — 99214 OFFICE O/P EST MOD 30 MIN: CPT | Mod: S$GLB,,, | Performed by: NURSE PRACTITIONER

## 2025-06-10 PROCEDURE — 85652 RBC SED RATE AUTOMATED: CPT

## 2025-06-10 PROCEDURE — 1160F RVW MEDS BY RX/DR IN RCRD: CPT | Mod: CPTII,S$GLB,, | Performed by: NURSE PRACTITIONER

## 2025-06-10 PROCEDURE — 84443 ASSAY THYROID STIM HORMONE: CPT

## 2025-06-10 PROCEDURE — 85025 COMPLETE CBC W/AUTO DIFF WBC: CPT

## 2025-06-10 PROCEDURE — 86140 C-REACTIVE PROTEIN: CPT

## 2025-06-10 PROCEDURE — 36415 COLL VENOUS BLD VENIPUNCTURE: CPT | Mod: PN

## 2025-06-10 PROCEDURE — 80053 COMPREHEN METABOLIC PANEL: CPT

## 2025-06-10 PROCEDURE — 99999 PR PBB SHADOW E&M-EST. PATIENT-LVL IV: CPT | Mod: PBBFAC,,, | Performed by: NURSE PRACTITIONER

## 2025-06-10 PROCEDURE — 86803 HEPATITIS C AB TEST: CPT

## 2025-06-10 PROCEDURE — 81003 URINALYSIS AUTO W/O SCOPE: CPT

## 2025-06-10 PROCEDURE — 3074F SYST BP LT 130 MM HG: CPT | Mod: CPTII,S$GLB,, | Performed by: NURSE PRACTITIONER

## 2025-06-10 PROCEDURE — 83036 HEMOGLOBIN GLYCOSYLATED A1C: CPT

## 2025-06-10 PROCEDURE — 1126F AMNT PAIN NOTED NONE PRSNT: CPT | Mod: CPTII,S$GLB,, | Performed by: NURSE PRACTITIONER

## 2025-06-10 PROCEDURE — 84153 ASSAY OF PSA TOTAL: CPT

## 2025-06-10 PROCEDURE — 83880 ASSAY OF NATRIURETIC PEPTIDE: CPT

## 2025-06-10 NOTE — PROGRESS NOTES
Routine Office Visit    Patient Name: Ryan Kenney    : 1950  MRN: [id number]    Chief Complaint:  Follow up heart failure, weight    Subjective:  Ryan is a 74 y.o. male who presents today for:    Follow up heart failure, weight - patient who is known to me reports today with his wife for evaluation.  He has AFib and heart failure managed by outside cardiologist.  He reports compliance with daily Lasix and Eliquis and Entresto.  Recently saw Cardiology had echocardiogram which showed severe mitral regurgitation.  It is being managed clinically.    He notes chronic shortness of breath for the last year which has been slightly worse as of late.  Recently tried to cut the grass and had to take many breaks.  He denies any leg swelling or chest pain.    His wife is concerned about his weight.  Around age 68 he retired and lost about 20-25 lb after that which he reports is due to inactivity.  His documented weight has been stable over the last year.  He is 167 lb today and documented weight was 168 at last visit in May of 2024.  He denies any chronic abdominal pain.  Denies any chronic GI or respiratory issues.  Denies any fevers or chills.    He denies any depressive symptoms like anhedonia or hopelessness.    Past Medical History  No past medical history on file.    Family History  No family history on file.    Current Medications  Medications Ordered Prior to Encounter[1]    Allergies   Review of patient's allergies indicates:  No Known Allergies    Review of Systems (Pertinent positives)  Review of Systems   Constitutional: Negative.  Negative for chills and fever.   HENT: Negative.  Negative for congestion, sinus pain and sore throat.    Eyes: Negative.    Respiratory:  Positive for shortness of breath. Negative for cough and wheezing.    Cardiovascular:  Negative for chest pain, palpitations, orthopnea and claudication.   Gastrointestinal: Negative.  Negative for abdominal pain, diarrhea, nausea and  vomiting.   Genitourinary: Negative.  Negative for dysuria, frequency and urgency.   Musculoskeletal: Negative.  Negative for back pain, joint pain and neck pain.   Skin: Negative.    Neurological: Negative.  Negative for dizziness, tingling, loss of consciousness and headaches.   Endo/Heme/Allergies: Negative.    Psychiatric/Behavioral: Negative.         /60 (BP Location: Right arm, Patient Position: Sitting)   Pulse 67   Temp 98.2 °F (36.8 °C) (Oral)   Resp 18   Ht 6' (1.829 m)   Wt 77.2 kg (170 lb 3.1 oz)   SpO2 95%   BMI 23.08 kg/m²     Physical Exam  Vitals reviewed.   Constitutional:       General: He is not in acute distress.     Appearance: Normal appearance. He is not ill-appearing, toxic-appearing or diaphoretic.   HENT:      Head: Normocephalic and atraumatic.   Cardiovascular:      Rate and Rhythm: Normal rate and regular rhythm.      Pulses: Normal pulses.      Heart sounds: Murmur heard.      Systolic murmur is present.   Pulmonary:      Effort: Pulmonary effort is normal. No respiratory distress.      Breath sounds: Examination of the right-lower field reveals rales. Examination of the left-lower field reveals rales. Rales present. No wheezing.   Abdominal:      General: Bowel sounds are normal. There is no distension.      Palpations: Abdomen is soft.      Tenderness: There is no abdominal tenderness.   Musculoskeletal:         General: No swelling, tenderness or deformity. Normal range of motion.   Skin:     General: Skin is warm and dry.      Capillary Refill: Capillary refill takes less than 2 seconds.   Neurological:      General: No focal deficit present.      Mental Status: He is alert and oriented to person, place, and time.   Psychiatric:         Mood and Affect: Mood normal.         Behavior: Behavior normal.            Assessment/Plan:  Ryan Kenney is a 74 y.o. male who presents today for :    Ryan was seen today for follow-up.    Diagnoses and all orders for this  visit:    Chronic systolic congestive heart failure  -     CBC Auto Differential; Future  -     Comprehensive Metabolic Panel; Future  -     Cancel: BNP; Future  -     X-Ray Chest PA And Lateral; Future  -     Lipid Panel; Future  -     Cancel: BNP  -     BNP; Future    Patient with CHF on Entresto and Eliquis.  He does have bibasilar crackles today.  Oxygen initially 92% increased to 95% on room air with deep breaths.  Will check chest x-ray and labs today including BNP.  Will likely ask patient to increase Lasix dose to b.i.d. for the next 3-5 days.  Will have patient follow up in clinic with me in 1 week to reassess.    Weight loss, unintentional  -     PSA, Screening; Future  -     Sedimentation rate; Future  -     C-Reactive Protein (In-House); Future  -     TSH; Future  -     HIV 1/2 Ag/Ab (4th Gen); Future  -     Hepatitis C Antibody; Future  -     Urinalysis; Future    Overall has lost weight within the last 5 years but is stable as of late.  His weight is slightly up from last year which could be due to fluid retention given his heart failure.  Will check lab work to evaluate.  We discussed potentially getting an abdominal and pelvic CT scan but patient would like to defer at this time.  Patient states he can not give a urine sample today.    Prediabetes  -     Hemoglobin A1C; Future    Check A1c.    Screening for malignant neoplasm of prostate  -     PSA, Screening; Future    Patient would like to get screening PSA done.        This office note has been dictated.  This dictation has been generated using M-Modal Fluency Direct dictation; some phonetic errors may occur.          [1]   Current Outpatient Medications on File Prior to Visit   Medication Sig Dispense Refill    apixaban (ELIQUIS) 5 mg Tab Take 5 mg by mouth 2 (two) times daily.      ENTRESTO 24-26 mg per tablet Take 1 tablet by mouth 2 (two) times daily.      furosemide (LASIX) 40 MG tablet Take 40 mg by mouth once daily.      loratadine  (CLARITIN) 10 mg tablet Take 1 tablet (10 mg total) by mouth daily as needed for Allergies. (Patient not taking: Reported on 6/10/2025) 90 tablet 3     No current facility-administered medications on file prior to visit.

## 2025-06-11 ENCOUNTER — TELEPHONE (OUTPATIENT)
Dept: FAMILY MEDICINE | Facility: CLINIC | Age: 75
End: 2025-06-11
Payer: MEDICARE

## 2025-06-11 NOTE — TELEPHONE ENCOUNTER
Patient called and notified of lab and x-ray results.  X-ray showing cardiomegaly.  Labs showing elevated BNP.  Recommended patient to take Lasix dose b.i.d. for the next 3-5 days.  Will follow up in clinic next week for reassessment.  Recommended patient to contact his cardiologist to let them know what is going on.  All questions answered

## 2025-06-17 ENCOUNTER — OFFICE VISIT (OUTPATIENT)
Dept: FAMILY MEDICINE | Facility: CLINIC | Age: 75
End: 2025-06-17
Payer: MEDICARE

## 2025-06-17 VITALS
BODY MASS INDEX: 21.17 KG/M2 | HEIGHT: 72 IN | RESPIRATION RATE: 18 BRPM | TEMPERATURE: 98 F | DIASTOLIC BLOOD PRESSURE: 60 MMHG | HEART RATE: 70 BPM | WEIGHT: 156.31 LBS | OXYGEN SATURATION: 95 % | SYSTOLIC BLOOD PRESSURE: 112 MMHG

## 2025-06-17 DIAGNOSIS — I50.22 CHRONIC SYSTOLIC CONGESTIVE HEART FAILURE: Primary | ICD-10-CM

## 2025-06-17 DIAGNOSIS — I34.0 MITRAL VALVE INSUFFICIENCY, UNSPECIFIED ETIOLOGY: ICD-10-CM

## 2025-06-17 DIAGNOSIS — I48.19 PERSISTENT ATRIAL FIBRILLATION: ICD-10-CM

## 2025-06-17 PROCEDURE — 3288F FALL RISK ASSESSMENT DOCD: CPT | Mod: CPTII,S$GLB,, | Performed by: NURSE PRACTITIONER

## 2025-06-17 PROCEDURE — 99999 PR PBB SHADOW E&M-EST. PATIENT-LVL IV: CPT | Mod: PBBFAC,,, | Performed by: NURSE PRACTITIONER

## 2025-06-17 PROCEDURE — 99214 OFFICE O/P EST MOD 30 MIN: CPT | Mod: S$GLB,,, | Performed by: NURSE PRACTITIONER

## 2025-06-17 PROCEDURE — 1126F AMNT PAIN NOTED NONE PRSNT: CPT | Mod: CPTII,S$GLB,, | Performed by: NURSE PRACTITIONER

## 2025-06-17 PROCEDURE — 1159F MED LIST DOCD IN RCRD: CPT | Mod: CPTII,S$GLB,, | Performed by: NURSE PRACTITIONER

## 2025-06-17 PROCEDURE — 1160F RVW MEDS BY RX/DR IN RCRD: CPT | Mod: CPTII,S$GLB,, | Performed by: NURSE PRACTITIONER

## 2025-06-17 PROCEDURE — 1101F PT FALLS ASSESS-DOCD LE1/YR: CPT | Mod: CPTII,S$GLB,, | Performed by: NURSE PRACTITIONER

## 2025-06-17 PROCEDURE — 3074F SYST BP LT 130 MM HG: CPT | Mod: CPTII,S$GLB,, | Performed by: NURSE PRACTITIONER

## 2025-06-17 PROCEDURE — G2211 COMPLEX E/M VISIT ADD ON: HCPCS | Mod: S$GLB,,, | Performed by: NURSE PRACTITIONER

## 2025-06-17 PROCEDURE — 3078F DIAST BP <80 MM HG: CPT | Mod: CPTII,S$GLB,, | Performed by: NURSE PRACTITIONER

## 2025-06-17 NOTE — PROGRESS NOTES
Routine Office Visit    Patient Name: Ryan Kenney    : 1950  MRN: [id number]    Chief Complaint:  Heart failure follow up    Subjective:  Ryan is a 74 y.o. male who presents today for:    Heart failure follow up - patient who is known to me reports today with his wife for evaluation.  Since our last visit he has been taking Lasix twice a day and reports his breathing is back at baseline.  Denies any appetite loss or other acute concerns today.  Recent lab work last week showed BNP in 390s.  Mild elevation in A1c.  No other significant abnormalities.  Chest x-ray showed cardiomegaly.  He reports compliance with the furosemide, Eliquis, and Entresto.    Past Medical History  No past medical history on file.    Family History  No family history on file.    Current Medications  Medications Ordered Prior to Encounter[1]    Allergies   Review of patient's allergies indicates:  No Known Allergies    Review of Systems (Pertinent positives)  Review of Systems   Constitutional: Negative.  Negative for chills and fever.   HENT: Negative.  Negative for congestion, sinus pain and sore throat.    Eyes: Negative.    Respiratory:  Negative for cough, shortness of breath and wheezing.    Cardiovascular:  Negative for chest pain, palpitations, orthopnea and claudication.   Gastrointestinal: Negative.  Negative for abdominal pain, diarrhea, nausea and vomiting.   Genitourinary: Negative.  Negative for dysuria, frequency and urgency.   Musculoskeletal: Negative.  Negative for back pain, joint pain and neck pain.   Skin: Negative.    Neurological: Negative.  Negative for dizziness, tingling, loss of consciousness and headaches.   Endo/Heme/Allergies: Negative.    Psychiatric/Behavioral: Negative.         /60 (BP Location: Right arm, Patient Position: Sitting)   Pulse 70   Temp 97.8 °F (36.6 °C) (Oral)   Resp 18   Ht 6' (1.829 m)   Wt 70.9 kg (156 lb 4.9 oz)   SpO2 95%   BMI 21.20 kg/m²     Physical Exam  Vitals  reviewed.   Constitutional:       General: He is not in acute distress.     Appearance: Normal appearance. He is not ill-appearing, toxic-appearing or diaphoretic.   HENT:      Head: Normocephalic and atraumatic.   Cardiovascular:      Rate and Rhythm: Normal rate and regular rhythm.      Pulses: Normal pulses.      Heart sounds: Murmur heard.      Systolic murmur is present.   Pulmonary:      Effort: Pulmonary effort is normal. No respiratory distress.      Breath sounds: Normal breath sounds. No wheezing.   Abdominal:      General: There is no distension.      Tenderness: There is no abdominal tenderness.   Musculoskeletal:         General: No swelling, tenderness or deformity. Normal range of motion.   Skin:     General: Skin is warm and dry.      Capillary Refill: Capillary refill takes less than 2 seconds.   Neurological:      General: No focal deficit present.      Mental Status: He is alert and oriented to person, place, and time.   Psychiatric:         Mood and Affect: Mood normal.         Behavior: Behavior normal.            Assessment/Plan:  Ryan Kenney is a 74 y.o. male who presents today for :    Ryan was seen today for follow-up.    Diagnoses and all orders for this visit:    Chronic systolic congestive heart failure    Mitral valve insufficiency, unspecified etiology    Persistent atrial fibrillation    Patient clinically much improved since visit last week.  O2 sats within normal limits.  Breath sounds improved.  Recommended patient to start taking his Lasix once a day as previously prescribed instead of twice a day now.  Clinically he is euvolemic today.  Recommended continued follow up with Cardiology as directed.  Patient reports no concerns in regards to his weight today.  It is decreased from previous visit likely related to increase in Lasix.  He will monitor his weight and follow up if he has any issues.  All questions answered.  Will set patient up with PCP visit in 3-4 months.  He can  follow up with me as needed in the future.        This office note has been dictated.  This dictation has been generated using M-Modal Fluency Direct dictation; some phonetic errors may occur.          [1]   Current Outpatient Medications on File Prior to Visit   Medication Sig Dispense Refill    apixaban (ELIQUIS) 5 mg Tab Take 5 mg by mouth 2 (two) times daily.      ENTRESTO 24-26 mg per tablet Take 1 tablet by mouth 2 (two) times daily.      furosemide (LASIX) 40 MG tablet Take 40 mg by mouth once daily.      loratadine (CLARITIN) 10 mg tablet Take 1 tablet (10 mg total) by mouth daily as needed for Allergies. 90 tablet 3     No current facility-administered medications on file prior to visit.

## 2025-08-29 PROBLEM — E87.1 HYPONATREMIA: Status: ACTIVE | Noted: 2025-01-01

## 2025-08-29 PROBLEM — I34.0 MITRAL REGURGITATION: Status: ACTIVE | Noted: 2025-01-01

## 2025-08-29 PROBLEM — J96.90 RESPIRATORY FAILURE: Status: ACTIVE | Noted: 2025-01-01

## 2025-08-29 PROBLEM — R06.02 SOB (SHORTNESS OF BREATH): Status: ACTIVE | Noted: 2025-01-01

## 2025-08-31 PROBLEM — R57.0 CARDIOGENIC SHOCK: Status: ACTIVE | Noted: 2025-01-01

## 2025-09-02 PROBLEM — I50.33 ACUTE ON CHRONIC DIASTOLIC CONGESTIVE HEART FAILURE: Status: ACTIVE | Noted: 2024-05-31

## 2025-09-02 PROBLEM — J96.01 ACUTE RESPIRATORY FAILURE WITH HYPOXIA: Status: ACTIVE | Noted: 2025-01-01

## 2025-09-03 PROBLEM — J69.0 ASPIRATION PNEUMONIA OF LEFT LOWER LOBE: Status: ACTIVE | Noted: 2025-01-01

## 2025-09-03 PROBLEM — Z71.89 ACP (ADVANCE CARE PLANNING): Status: ACTIVE | Noted: 2025-01-01

## 2025-09-03 PROBLEM — J98.11 ATELECTASIS OF LEFT LUNG: Status: ACTIVE | Noted: 2025-01-01

## 2025-09-04 PROBLEM — I47.20 VT (VENTRICULAR TACHYCARDIA): Status: ACTIVE | Noted: 2025-01-01

## 2025-09-05 PROBLEM — E87.6 HYPOKALEMIA: Status: ACTIVE | Noted: 2025-01-01
